# Patient Record
Sex: MALE | Race: WHITE | Employment: OTHER | URBAN - NONMETROPOLITAN AREA
[De-identification: names, ages, dates, MRNs, and addresses within clinical notes are randomized per-mention and may not be internally consistent; named-entity substitution may affect disease eponyms.]

---

## 2017-01-10 ENCOUNTER — ALLIED HEALTH/NURSE VISIT (OUTPATIENT)
Dept: FAMILY MEDICINE | Facility: CLINIC | Age: 67
End: 2017-01-10
Payer: COMMERCIAL

## 2017-01-10 DIAGNOSIS — C43.9 MELANOMA OF SKIN (H): Primary | ICD-10-CM

## 2017-01-10 PROCEDURE — 99207 ZZC NO CHARGE NURSE ONLY: CPT

## 2017-01-10 NOTE — PROGRESS NOTES
Placed band- aids on the incision area with Aqua phor per patient request.  Wound looked good.  There was a little redness around the site.  No drainage or warmth to the area.  Patient will follow up with Derm.    Angelika LAINEZ RN

## 2017-02-14 ENCOUNTER — OFFICE VISIT (OUTPATIENT)
Dept: FAMILY MEDICINE | Facility: CLINIC | Age: 67
End: 2017-02-14
Payer: COMMERCIAL

## 2017-02-14 VITALS
HEIGHT: 77 IN | SYSTOLIC BLOOD PRESSURE: 150 MMHG | DIASTOLIC BLOOD PRESSURE: 82 MMHG | HEART RATE: 60 BPM | BODY MASS INDEX: 37.19 KG/M2 | WEIGHT: 315 LBS

## 2017-02-14 DIAGNOSIS — I10 HYPERTENSION GOAL BP (BLOOD PRESSURE) < 140/90: ICD-10-CM

## 2017-02-14 DIAGNOSIS — K80.10 CALCULUS OF GALLBLADDER WITH CHOLECYSTITIS WITHOUT BILIARY OBSTRUCTION, UNSPECIFIED CHOLECYSTITIS ACUITY: ICD-10-CM

## 2017-02-14 DIAGNOSIS — R10.13 EPIGASTRIC PAIN: Primary | ICD-10-CM

## 2017-02-14 PROCEDURE — 99214 OFFICE O/P EST MOD 30 MIN: CPT | Performed by: FAMILY MEDICINE

## 2017-02-14 NOTE — PATIENT INSTRUCTIONS
Take Prilosec OTC daily until we get the results of the ultrasound.  Avoid fatty foods in the meantime. Call [phone] to schedule the ultrasound.    It is very important that you take your metoprolol every day

## 2017-02-14 NOTE — MR AVS SNAPSHOT
"              After Visit Summary   2/14/2017    Angel Hill    MRN: [id number]           Patient Information     Date Of Birth          1950        Visit Information        Provider Department      2/14/2017 8:00 AM SHEKHAR Bueno MD [facility]        Today's Diagnoses     Epigastric pain    -  1      Care Instructions    Take Prilosec OTC daily until we get the results of the ultrasound.  Avoid fatty foods in the meantime. Call [phone] to schedule the ultrasound.    It is very important that you take your metoprolol every day        Follow-ups after your visit        Future tests that were ordered for you today     Open Future Orders        Priority Expected Expires Ordered    US Abdomen Complete Routine 5/15/2017 2/14/2018 2/14/2017            Who to contact     If you have questions or need follow up information about today's clinic visit or your schedule please contact [facility] directly at [phone].  Normal or non-critical lab and imaging results will be communicated to you by [employer], letter or phone within 4 business days after the clinic has received the results. If you do not hear from us within 7 days, please contact the clinic through [employer] or phone. If you have a critical or abnormal lab result, we will notify you by phone as soon as possible.  Submit refill requests through [employer] or call your pharmacy and they will forward the refill request to us. Please allow 3 business days for your refill to be completed.          Additional Information About Your Visit        [employer] Information     [employer] lets you send messages to your doctor, view your test results, renew your prescriptions, schedule appointments and more. To sign up, go to [email or web address] . Click on \"Log in\" on the left side of the screen, which will take you to the Welcome page. Then click on \"Sign up Now\" on the right side of the page.     You will be asked to enter the access " "code listed below, as well as some personal information. Please follow the directions to create your username and password.     Your access code is: [email or web address]  Expires: 5/15/2017  8:37 AM     Your access code will  in 90 days. If you need help or a new code, please call your [facility] clinic or [phone].        Care EveryWhere ID     This is your Care EveryWhere ID. This could be used by other organizations to access your [facility] medical records  [id number]        Your Vitals Were     Pulse Height BMI (Body Mass Index)             60 6' 5\" (1.956 m) 39.84 kg/m2          Blood Pressure from Last 3 Encounters:   17 150/82   10/01/16 (!) 182/98   16 139/79    Weight from Last 3 Encounters:   17 (!) 336 lb (152.4 kg)   16 (!) 336 lb (152.4 kg)   16 (!) 332 lb (150.6 kg)               Primary Care Provider Office Phone # Fax #    Ric Umanzor [phone]       [facility] [id number] [place] [id number]        Thank you!     Thank you for choosing [facility]  for your care. Our goal is always to provide you with excellent care. Hearing back from our patients is one way we can continue to improve our services. Please take a few minutes to complete the written survey that you may receive in the mail after your visit with us. Thank you!             Your Updated Medication List - Protect others around you: Learn how to safely use, store and throw away your medicines at www.disposemymeds.org.          This list is accurate as of: 17  8:37 AM.  Always use your most recent med list.                   Brand Name Dispense Instructions for use    ASPIRIN PO      Take 650 mg by mouth       coenzyme Q-10 200 MG Caps          fish oil-omega-3 fatty acids 1000 MG capsule      Take 2 g by mouth daily       metoprolol 200 MG 24 hr tablet    TOPROL-XL    90 tablet    Take 1 tablet (200 mg) by mouth daily       VITAMIN D3 PO      " Take 1,000 Units by mouth daily

## 2017-02-14 NOTE — PROGRESS NOTES
"Subjective:  Angel Hill is a 66 year old male   Chief Complaint   Patient presents with     Patient Request     concerns with a lot of gas, bloating worse after eating X 1 month. last BM yesterday pt. states that seems to be fine.      Medication Reconciliation     pt. states he is not consistent with taking Metoprolol.      He states that he will get this discomfort with bloating and feeling like he has gas to matter what he eats. It was wake him up in the middle the night. He has not made any significant change in his diet. He has tried several antacids and these do not seem to help at all. No known history of gallbladder disease. There is been no change in his bowel movements.    He admits he has been very inconsistent in taking his metoprolol        Encounter Diagnoses   Name Primary?     Epigastric pain Yes     Hypertension goal BP (blood pressure) < 140/90        ROS:General: No change in weight, sleep or appetite.  Normal energy.  No fever or chills  Resp: No coughing, wheezing or shortness of breath  CV: No chest pains or palpitations  GI: As above, otherwise negative  : No urinary frequency or dysuria, bladder or kidney problems    Medical, surgical, social, and family histories, medications and allergies reviewed and updated.    Objective:  Exam:  /82 (BP Location: Right arm, Patient Position: Chair, Cuff Size: Adult Large)  Pulse 60  Ht 6' 5\" (1.956 m)  Wt (!) 336 lb (152.4 kg)  BMI 39.84 kg/m2 GENERAL APPEARANCE ADULT: Alert, no acute distress  EYES: PERRL, EOM normal, conjunctiva and lids normal  NECK: No adenopathy,masses or thyromegaly  RESP: lungs clear to auscultation   CV: normal rate, regular rhythm, no murmur or gallop  ABDOMEN: soft, no organomegaly or masses , bowel sounds normal, tender epigastric moderate, moderate distension  MS: extremities normal, no peripheral edema      ASSESSMENT:  1. Epigastric pain    2. Hypertension goal BP (blood pressure) < 140/90     differential " diagnosis for the epigastric pain would include gallbladder disease, gastritis, esophageal reflux.    Hypertension inadequately controlled due to noncompliance    PLAN:  Orders Placed This Encounter     US Abdomen Complete       Patient Instructions   Take Prilosec OTC daily until we get the results of the ultrasound.  Avoid fatty foods in the meantime. Call [phone] to schedule the ultrasound.    It is very important that you take your metoprolol every day

## 2017-02-16 ENCOUNTER — HOSPITAL ENCOUNTER (OUTPATIENT)
Dept: ULTRASOUND IMAGING | Facility: CLINIC | Age: 67
Discharge: HOME | End: 2017-02-16
Attending: FAMILY MEDICINE | Admitting: FAMILY MEDICINE
Payer: COMMERCIAL

## 2017-02-16 DIAGNOSIS — R10.13 EPIGASTRIC PAIN: ICD-10-CM

## 2017-02-16 PROCEDURE — 76700 US EXAM ABDOM COMPLETE: CPT

## 2017-02-16 NOTE — PROGRESS NOTES
Please CALL PATIENT    Notify patient of ABNORMAL results.  The gallbladder has stones in it and could be the cause of this discomfort. I put in a referral for you to see one of the surgeons over at [place]. Call [phone] to schedule

## 2017-02-17 ENCOUNTER — OFFICE VISIT (OUTPATIENT)
Dept: SURGERY | Facility: CLINIC | Age: 67
End: 2017-02-17
Payer: COMMERCIAL

## 2017-02-17 VITALS
WEIGHT: 315 LBS | BODY MASS INDEX: 37.19 KG/M2 | SYSTOLIC BLOOD PRESSURE: 139 MMHG | HEIGHT: 77 IN | DIASTOLIC BLOOD PRESSURE: 72 MMHG | TEMPERATURE: 98 F | HEART RATE: 65 BPM

## 2017-02-17 DIAGNOSIS — R14.0 BLOATING: Primary | ICD-10-CM

## 2017-02-17 PROCEDURE — 99204 OFFICE O/P NEW MOD 45 MIN: CPT | Performed by: SURGERY

## 2017-02-17 NOTE — PROGRESS NOTES
"Asked to see patient in consultation for his abdominal pain.    66-year-old male complaining of generalized gastric pain and bloating. Patient has been having symptoms for well over a year. He reports he has difficulty burping or passing gas. He feels like he is \"bound up\" with gas. Patient denies pain per se but does report a generalized discomfort. He denies this is associated with food and he denies yonny pain. Patient is a long-haul . Patient denies fevers chills nausea and vomiting. He does report decreased sleep secondary to the gas.    Patient Active Problem List   Diagnosis     Melanoma of skin (H)     Urethritis     PROSTATE CARCINOMA     Obesity     CARDIOVASCULAR SCREENING; LDL GOAL LESS THAN 130     Advanced directives, counseling/discussion     Hypertension goal BP (blood pressure) < 140/90     Osteoarthritis of left hip, unspecified osteoarthritis type       Past Medical History   Diagnosis Date     Melanoma of skin, site unspecified      melanoma     Urethritis, unspecified      ulcer 20 + years ago       Past Surgical History   Procedure Laterality Date     Surgical history of -        cyst removed from face     Surgical history of -        imapacted wisdom teeth     Surgical history of -   1997     hammer toe     Surgical history of -   7/01     metastalic melanoma     Colonoscopy  9/19/2002     Surgical history of -   9/01     left axillary node removal     Surgical history of -   4/08     prostate cryoablation     Ablate vein varicose radio frequency without phlebectomy multiple stab  9/20/2012     Procedure: ABLATE VEIN VARICOSE RADIO FREQUENCY WITHOUT PHLEBECTOMY MULTIPLE STAB;  Right Leg VNUS Ablation, Right ankle ulcer debridement;  Surgeon: Jordan Lopez MD;  Location: [facility] OR       Family History   Problem Relation Age of Onset     Musculoskeletal Disorder Mother      CANCER Father      Hypertension Maternal Grandmother      HEART DISEASE Maternal Grandfather      HEART " DISEASE Paternal Grandmother      HEART DISEASE Paternal Grandfather      Musculoskeletal Disorder Sister      foot problems     Unknown/Adopted Sister      Unknown/Adopted Brother        Social History   Substance Use Topics     Smoking status: Former Smoker     Quit date: 1/1/1988     Smokeless tobacco: Former User      Comment: 1980     Alcohol use Yes      Comment: 2 beers once a week        Smoking - Counseled patient on the effects of smoking on surgical issues such as wound healing and infection rates.    History   Drug Use No       Current Outpatient Prescriptions   Medication Sig Dispense Refill     Cholecalciferol (VITAMIN D3 PO) Take 1,000 Units by mouth daily       ASPIRIN PO Take 650 mg by mouth       metoprolol (TOPROL-XL) 200 MG 24 hr tablet Take 1 tablet (200 mg) by mouth daily 90 tablet 3     coenzyme Q-10 200 MG CAPS        fish oil-omega-3 fatty acids (OMEGA 3) 1000 MG capsule Take 2 g by mouth daily         Allergies   Allergen Reactions     Nka [No Known Allergies]      Results for orders placed or performed during the hospital encounter of 02/16/17   US Abdomen Complete    Narrative    US ABDOMEN COMPLETE 2/16/2017 8:24 AM    HISTORY: Epigastric pain.    TECHNIQUE: Routine assessed structures include the gallbladder, bile  ducts, liver, pancreas, kidneys, and spleen size. Also assessed are  the abdominal aorta and hepatic portion of the IVC.    COMPARISON: None.    FINDINGS: Increased echogenicity to the liver parenchyma is seen in a  diffuse fashion. No focal liver lesion is demonstrated. A single  gallstone is present in the gallbladder. There is no gallbladder wall  thickening or biliary dilatation. The pancreas is partially obscured  by overlying bowel gas. The kidneys are within normal limits. The  spleen is not enlarged. The abdominal aorta is not aneurysmal.        Impression    IMPRESSION:    1. Fatty infiltration of liver.  2. Cholelithiasis.        JOSH FRITZ MD  "    ROS  Constitutional - Denies fevers, weight loss, malaise, lethargy  Neuro - Denies tremors or seizures  Pulmon - Denies SOB, dyspnea, hemoptysis, chronic cough or use of an inhaler  CV - Denies CP, SOB, lower extremity edema, difficulty w/ stairs, has never used NTG  GI - Denies hematemesis, BRBPR, melena, chronic diarrhea or epigastric pain   - Denies hematuria, difficulty voiding, h/o STDs  Hematology - Denies blood clotting disorders, chronic anemias  Dermatology - No melanomas or skin cancers  Rheumatology - No h/o RA  Pysch - Denies depression, bipolar d/o or schizophrenia    Exam:  Patient Vitals for the past 24 hrs:   BP Temp Temp src Pulse Height Weight   02/17/17 0738 139/72 98  F (36.7  C) Oral 65 1.956 m (6' 5.01\") (!) 152.4 kg (336 lb)       General - Alert and Oriented X4, NAD, well nourished  HEENT - Normocephalic, atraumatic, PERRLA, Nose midline, Throat without lesions  Neck - supple, no LAD, Thyroid normal, Carotids without bruits  Lungs - Clear to auscultation bilaterally with good inspiratory effort, no tactile fremitus  CV - Heart RRR, no lift's, thrills, murmurs, rubs, or gallops. Carotid, radial, and femoral pulses 2+ bilaterally  Abdomen - Soft, non-tender, +BS, no hepatosplenomegaly, no palpable masses  Neuro - Full ROM, Strength 5/5 and major muscle groups, sensation intact  Extremities - No cyanosis, clubbing or edema    Assessment and plan: 66-year-old male with generalized bloating and gas pain. Unlikely this is related to his single gallstone which is mobile and does not appear to be lodged in the neck of the gallbladder. Recommend upper endoscopy to evaluate patient's stomach and he will be needing a colonoscopy as his last one was in 2002. I will go ahead and schedule both of these.    Georges Kiser MD   "

## 2017-02-17 NOTE — MR AVS SNAPSHOT
After Visit Summary   2/17/2017    Angel Hill    MRN: [id number]           Patient Information     Date Of Birth          1950        Visit Information        Provider Department      2/17/2017 8:00 AM Georges Kiser MD [facility]        Today's Diagnoses     Bloating    -  1      Care Instructions    Per Dr. Kiser's instructions        Follow-ups after your visit        Additional Services     GASTROENTEROLOGY ADULT REF PROCEDURE ONLY       Last Lab Result: Creatinine (mg/dL)       Date                     Value                 05/08/2015               0.82             ----------  Body mass index is 39.84 kg/(m^2).      Patient will be contacted to schedule procedure.     Please be aware that coverage of these services is subject to the terms and limitations of your health insurance plan.  Call member services at your health plan with any benefit or coverage questions.  Any procedures must be performed at a [facility] facility OR coordinated by your clinic's referral office.    Please bring the following with you to your appointment:    (1) Any X-Rays, CTs or MRIs which have been performed.  Contact the facility where they were done to arrange for  prior to your scheduled appointment.    (2) List of current medications   (3) This referral request   (4) Any documents/labs given to you for this referral                  Who to contact     If you have questions or need follow up information about today's clinic visit or your schedule please contact [facility] directly at [phone].  Normal or non-critical lab and imaging results will be communicated to you by MyChart, letter or phone within 4 business days after the clinic has received the results. If you do not hear from us within 7 days, please contact the clinic through MyChart or phone. If you have a critical or abnormal lab result, we will notify you by phone as soon as possible.  Submit refill requests  "through [employer] or call your pharmacy and they will forward the refill request to us. Please allow 3 business days for your refill to be completed.          Additional Information About Your Visit        [employer] Information     [employer] lets you send messages to your doctor, view your test results, renew your prescriptions, schedule appointments and more. To sign up, go to [email or web address] . Click on \"Log in\" on the left side of the screen, which will take you to the Welcome page. Then click on \"Sign up Now\" on the right side of the page.     You will be asked to enter the access code listed below, as well as some personal information. Please follow the directions to create your username and password.     Your access code is: [email or web address]  Expires: 5/15/2017  8:37 AM     Your access code will  in 90 days. If you need help or a new code, please call your [facility] clinic or [phone].        Care EveryWhere ID     This is your Care EveryWhere ID. This could be used by other organizations to access your [facility] medical records  [id number]        Your Vitals Were     Pulse Temperature Height BMI (Body Mass Index)          65 98  F (36.7  C) (Oral) 1.956 m (6' 5.01\") 39.84 kg/m2         Blood Pressure from Last 3 Encounters:   17 139/72   17 150/82   10/01/16 (!) 182/98    Weight from Last 3 Encounters:   17 (!) 152.4 kg (336 lb)   17 (!) 152.4 kg (336 lb)   16 (!) 152.4 kg (336 lb)              We Performed the Following     GASTROENTEROLOGY ADULT REF PROCEDURE ONLY        Primary Care Provider Office Phone # Fax #    Ric Umanzor [phone]       [facility] [address] [facility] [address]        Thank you!     Thank you for choosing [facility]  for your care. Our goal is always to provide you with excellent care. Hearing back from our patients is one way we can continue to improve our services. Please take a few " minutes to complete the written survey that you may receive in the mail after your visit with us. Thank you!             Your Updated Medication List - Protect others around you: Learn how to safely use, store and throw away your medicines at www.disposemymeds.org.          This list is accurate as of: 2/17/17  8:02 AM.  Always use your most recent med list.                   Brand Name Dispense Instructions for use    ASPIRIN PO      Take 650 mg by mouth       coenzyme Q-10 200 MG Caps          fish oil-omega-3 fatty acids 1000 MG capsule      Take 2 g by mouth daily       metoprolol 200 MG 24 hr tablet    TOPROL-XL    90 tablet    Take 1 tablet (200 mg) by mouth daily       VITAMIN D3 PO      Take 1,000 Units by mouth daily

## 2017-02-17 NOTE — NURSING NOTE
"Initial /72 (BP Location: Right arm, Patient Position: Chair, Cuff Size: Adult Large)  Pulse 65  Temp 98  F (36.7  C) (Oral)  Ht 1.956 m (6' 5.01\")  Wt (!) 152.4 kg (336 lb)  BMI 39.84 kg/m2 Estimated body mass index is 39.84 kg/(m^2) as calculated from the following:    Height as of this encounter: 1.956 m (6' 5.01\").    Weight as of this encounter: 152.4 kg (336 lb). .    Jeannette Orlando MA    "

## 2017-02-22 ENCOUNTER — ANESTHESIA EVENT (OUTPATIENT)
Dept: GASTROENTEROLOGY | Facility: CLINIC | Age: 67
End: 2017-02-22
Payer: COMMERCIAL

## 2017-02-22 ASSESSMENT — LIFESTYLE VARIABLES: TOBACCO_USE: 1

## 2017-02-22 NOTE — ANESTHESIA PREPROCEDURE EVALUATION
Anesthesia Evaluation     . Pt has had prior anesthetic.       ROS/MED HX    ENT/Pulmonary:     (+)tobacco use, Past use , . .    Neurologic:       Cardiovascular:     (+) hypertension----. : . . . :. .       METS/Exercise Tolerance:     Hematologic:         Musculoskeletal:   (+) arthritis, , , -       GI/Hepatic:         Renal/Genitourinary:     (+) Other Renal/ Genitourinary,       Endo:     (+) Obesity, .   Type II DM: urethritis.   Psychiatric:         Infectious Disease:         Malignancy:   (+) Malignancy History of Skin and Prostate          Other:               Physical Exam  Normal systems: cardiovascular, pulmonary and dental    Airway   Mallampati: I  TM distance: >3 FB  Neck ROM: full    Dental     Cardiovascular   Rhythm and rate: regular and normal      Pulmonary    breath sounds clear to auscultation                    Anesthesia Plan      History & Physical Review  History and physical reviewed and following examination; no interval change.    ASA Status:  3 .    NPO Status:  > 6 hours    Plan for MAC Reason for MAC:  Deep or markedly invasive procedure (G8)         Postoperative Care      Consents  Anesthetic plan, risks, benefits and alternatives discussed with:  Patient..                          .

## 2017-02-23 ENCOUNTER — ANESTHESIA (OUTPATIENT)
Dept: GASTROENTEROLOGY | Facility: CLINIC | Age: 67
End: 2017-02-23
Payer: COMMERCIAL

## 2017-02-23 ENCOUNTER — HOSPITAL ENCOUNTER (OUTPATIENT)
Facility: CLINIC | Age: 67
Discharge: HOME | End: 2017-02-23
Attending: SURGERY | Admitting: SURGERY
Payer: COMMERCIAL

## 2017-02-23 ENCOUNTER — SURGERY (OUTPATIENT)
Age: 67
End: 2017-02-23

## 2017-02-23 VITALS
SYSTOLIC BLOOD PRESSURE: 115 MMHG | TEMPERATURE: 98.1 F | RESPIRATION RATE: 16 BRPM | OXYGEN SATURATION: 96 % | DIASTOLIC BLOOD PRESSURE: 46 MMHG | HEART RATE: 61 BPM

## 2017-02-23 LAB
COLONOSCOPY: NORMAL
UPPER GI ENDOSCOPY: NORMAL

## 2017-02-23 PROCEDURE — 25000125 ZZHC RX 250: Performed by: NURSE ANESTHETIST, CERTIFIED REGISTERED

## 2017-02-23 PROCEDURE — 88305 TISSUE EXAM BY PATHOLOGIST: CPT | Mod: 26 | Performed by: SURGERY

## 2017-02-23 PROCEDURE — 25000132 ZZH RX MED GY IP 250 OP 250 PS 637: Performed by: SURGERY

## 2017-02-23 PROCEDURE — G0121 COLON CA SCRN NOT HI RSK IND: HCPCS | Performed by: SURGERY

## 2017-02-23 PROCEDURE — 43239 EGD BIOPSY SINGLE/MULTIPLE: CPT | Performed by: SURGERY

## 2017-02-23 PROCEDURE — 88305 TISSUE EXAM BY PATHOLOGIST: CPT | Performed by: SURGERY

## 2017-02-23 PROCEDURE — 25000125 ZZHC RX 250: Performed by: SURGERY

## 2017-02-23 PROCEDURE — 45378 DIAGNOSTIC COLONOSCOPY: CPT | Performed by: SURGERY

## 2017-02-23 PROCEDURE — 37000008 ZZH ANESTHESIA TECHNICAL FEE, 1ST 30 MIN: Performed by: SURGERY

## 2017-02-23 RX ORDER — SIMETHICONE 40MG/0.6ML
SUSPENSION, DROPS(FINAL DOSAGE FORM)(ML) ORAL PRN
Status: DISCONTINUED | OUTPATIENT
Start: 2017-02-23 | End: 2017-02-23 | Stop reason: HOSPADM

## 2017-02-23 RX ORDER — PROPOFOL 10 MG/ML
INJECTION, EMULSION INTRAVENOUS PRN
Status: DISCONTINUED | OUTPATIENT
Start: 2017-02-23 | End: 2017-02-23

## 2017-02-23 RX ORDER — LIDOCAINE 40 MG/G
CREAM TOPICAL
Status: DISCONTINUED | OUTPATIENT
Start: 2017-02-23 | End: 2017-02-23 | Stop reason: HOSPADM

## 2017-02-23 RX ORDER — ONDANSETRON 2 MG/ML
4 INJECTION INTRAMUSCULAR; INTRAVENOUS
Status: DISCONTINUED | OUTPATIENT
Start: 2017-02-23 | End: 2017-02-23 | Stop reason: HOSPADM

## 2017-02-23 RX ADMIN — PROPOFOL 100 MG: 10 INJECTION, EMULSION INTRAVENOUS at 12:56

## 2017-02-23 RX ADMIN — PROPOFOL 100 MG: 10 INJECTION, EMULSION INTRAVENOUS at 12:55

## 2017-02-23 RX ADMIN — PROPOFOL 100 MG: 10 INJECTION, EMULSION INTRAVENOUS at 12:51

## 2017-02-23 RX ADMIN — PROPOFOL 100 MG: 10 INJECTION, EMULSION INTRAVENOUS at 12:53

## 2017-02-23 RX ADMIN — SIMETHICONE 40 MG: 63.3; 3.7 SOLUTION/ DROPS ORAL at 12:51

## 2017-02-23 RX ADMIN — BENZOCAINE 1 SPRAY: 220 SPRAY, METERED PERIODONTAL at 12:51

## 2017-02-23 RX ADMIN — LIDOCAINE HYDROCHLORIDE 1 ML: 10 INJECTION, SOLUTION INFILTRATION; PERINEURAL at 12:02

## 2017-02-23 RX ADMIN — PROPOFOL 100 MG: 10 INJECTION, EMULSION INTRAVENOUS at 12:52

## 2017-02-23 RX ADMIN — PROPOFOL 100 MG: 10 INJECTION, EMULSION INTRAVENOUS at 12:54

## 2017-02-23 NOTE — ANESTHESIA POSTPROCEDURE EVALUATION
Patient: Angel Hill    Procedure(s):  Colonoscopy & Gastroscopy   - Wound Class: II-Clean Contaminated   - Wound Class: II-Clean Contaminated    Diagnosis:;  Diagnosis Additional Information: No value filed.    Anesthesia Type:  MAC    Note:  Anesthesia Post Evaluation    Patient location during evaluation: Bedside  Patient participation: Able to fully participate in evaluation  Level of consciousness: awake and alert  Pain management: adequate  Airway patency: patent  Cardiovascular status: acceptable  Respiratory status: acceptable  Hydration status: acceptable  PONV: none     Anesthetic complications: None          Last vitals:  Vitals:    02/23/17 1140   BP: 165/81   Pulse: 74   Resp: 16   Temp: 36.7  C (98.1  F)         Electronically Signed By: DAGOBERTO Christensen CRNA  February 23, 2017  1:10 PM

## 2017-02-23 NOTE — OP NOTE
EGD and Colonoscopy -normal esophagus, stomach and duodenum.  Colon without masses, polyps, or diverticula.

## 2017-02-23 NOTE — ANESTHESIA CARE TRANSFER NOTE
Patient: Angel Hill    Procedure(s):  Colonoscopy & Gastroscopy   - Wound Class: II-Clean Contaminated   - Wound Class: II-Clean Contaminated    Diagnosis: ;  Diagnosis Additional Information: No value filed.    Anesthesia Type:   MAC     Note:  Airway :Room Air  Patient transferred to:Phase II        Vitals: (Last set prior to Anesthesia Care Transfer)    CRNA VITALS  2/23/2017 1239 - 2/23/2017 1309      2/23/2017             Pulse: 63    SpO2: 96 %                Electronically Signed By: DAGOBERTO Christensen CRNA  February 23, 2017  1:09 PM

## 2017-02-23 NOTE — H&P
66 year old year old male here for upper and lower endoscopy for history of bloating.        Patient Active Problem List   Diagnosis     Melanoma of skin (H)     Urethritis     PROSTATE CARCINOMA     Obesity     CARDIOVASCULAR SCREENING; LDL GOAL LESS THAN 130     Advanced directives, counseling/discussion     Hypertension goal BP (blood pressure) < 140/90     Osteoarthritis of left hip, unspecified osteoarthritis type       Past Medical History   Diagnosis Date     Melanoma of skin, site unspecified      melanoma     Urethritis, unspecified      ulcer 20 + years ago       Past Surgical History   Procedure Laterality Date     Surgical history of -        cyst removed from face     Surgical history of -        imapacted wisdom teeth     Surgical history of -   1997     hammer toe     Surgical history of -   7/01     metastalic melanoma     Colonoscopy  9/19/2002     Surgical history of -   9/01     left axillary node removal     Surgical history of -   4/08     prostate cryoablation     Ablate vein varicose radio frequency without phlebectomy multiple stab  9/20/2012     Procedure: ABLATE VEIN VARICOSE RADIO FREQUENCY WITHOUT PHLEBECTOMY MULTIPLE STAB;  Right Leg VNUS Ablation, Right ankle ulcer debridement;  Surgeon: Jordan Lopez MD;  Location: [facility] OR       Family History   Problem Relation Age of Onset     Musculoskeletal Disorder Mother      CANCER Father      Hypertension Maternal Grandmother      HEART DISEASE Maternal Grandfather      HEART DISEASE Paternal Grandmother      HEART DISEASE Paternal Grandfather      Musculoskeletal Disorder Sister      foot problems     Unknown/Adopted Sister      Unknown/Adopted Brother        No current outpatient prescriptions on file.       Allergies   Allergen Reactions     Nka [No Known Allergies]        Pt reports that he quit smoking about 29 years ago. He has quit using smokeless tobacco. He reports that he drinks alcohol. He reports that he does not use illicit  drugs.    Exam:    Awake, Alert OX3  Lungs - CTA bilaterally  CV - RRR, no murmurs, distal pulses intact  Abd - soft, non-distended, non-tender, +BS  Extr - No cyanosis or edema    A/P 66 year old year old male in need ofupper and lower endoscopy for history of bloating  . Risks, benefits, alternatives, and complications were discussed including the possibility of perforation and the patient agreed to proceed.    Georges Kiser MD

## 2017-02-27 LAB — COPATH REPORT: NORMAL

## 2017-03-01 ENCOUNTER — TELEPHONE (OUTPATIENT)
Dept: SURGERY | Facility: CLINIC | Age: 67
End: 2017-03-01

## 2017-03-01 NOTE — TELEPHONE ENCOUNTER
Patient advised of normal result from the result note basket per Dr. Kiser. Advised if he has further troubles to see Dr. Bueno again.    Maria A Bartlett RN

## 2017-03-01 NOTE — TELEPHONE ENCOUNTER
Reason for Call:  Request for results:    Name of test or procedure: gastro and colonoscopy bx results    Date of test of procedure: 2-23-17    Location of the test or procedure:  [place]    OK to leave the result message on voice mail or with a family member? Not Applicable    Phone number Patient can be reached at:  Home number on file [phone] (home)    Additional comments: NA    Call taken on 3/1/2017 at 8:20 AM by Mili Gonsales

## 2017-05-02 ENCOUNTER — TELEPHONE (OUTPATIENT)
Dept: FAMILY MEDICINE | Facility: CLINIC | Age: 67
End: 2017-05-02

## 2017-05-02 DIAGNOSIS — M16.12 OSTEOARTHRITIS OF LEFT HIP, UNSPECIFIED OSTEOARTHRITIS TYPE: Primary | ICD-10-CM

## 2017-05-02 NOTE — TELEPHONE ENCOUNTER
Order was placed. Notify patient that someone from ortho [facility] should be contacting him within 24 hours

## 2017-05-02 NOTE — TELEPHONE ENCOUNTER
Reason for Call: Request for an order or referral:    Order or referral being requested: Pt continues to have hip pain and he wants to know if he can get a referral to see an ortho specialist for a hip replacement.      Date needed: at your convenience    Has the patient been seen by the PCP for this problem? YES    Additional comments:     Phone number Patient can be reached at:  Home number on file [phone] (home)    Best Time:  any    Can we leave a detailed message on this number?  YES    Call taken on 5/2/2017 at 8:49 AM by Sonia Arias

## 2017-05-02 NOTE — TELEPHONE ENCOUNTER
Patient was already contacted by [facility] and has an appt with [facility]. Svitlana GUAJARDO RN

## 2017-05-02 NOTE — TELEPHONE ENCOUNTER
Writer returned call to pt  Pt was very upset stating over and over that he is in pain and needs to see a specialist.  Last office visit with provider was 2/14/17  He is asking to see a specialists due to financial reasons.   Referral is pending   Please advise   Thank you   Felicitas BACH RN

## 2017-05-02 NOTE — TELEPHONE ENCOUNTER
Tried to transfer to RN but she was unavailable  Please return call to [phone] when able   Thank you     Saima Reilly  [facility]

## 2017-06-05 ENCOUNTER — HOSPITAL ENCOUNTER (OUTPATIENT)
Dept: PHYSICAL THERAPY | Facility: CLINIC | Age: 67
Setting detail: THERAPIES SERIES
End: 2017-06-05
Attending: PHYSICAL MEDICINE & REHABILITATION
Payer: COMMERCIAL

## 2017-06-05 PROCEDURE — 97110 THERAPEUTIC EXERCISES: CPT | Mod: GP | Performed by: PHYSICAL THERAPIST

## 2017-06-05 PROCEDURE — 97161 PT EVAL LOW COMPLEX 20 MIN: CPT | Mod: GP | Performed by: PHYSICAL THERAPIST

## 2017-06-05 PROCEDURE — 40000718 ZZHC STATISTIC PT DEPARTMENT ORTHO VISIT: Performed by: PHYSICAL THERAPIST

## 2017-06-05 PROCEDURE — 97140 MANUAL THERAPY 1/> REGIONS: CPT | Mod: GP | Performed by: PHYSICAL THERAPIST

## 2017-06-05 NOTE — PROGRESS NOTES
06/05/17 0700   General Information   Type of Visit Initial OP Ortho PT Evaluation   Start of Care Date 06/05/17   Referring Physician Delmar Blackmon    Patient/Family Goals Statement Decrease P, P w/ sit-stand, Prolonged standing, Falling asleep easier.    Orders Evaluate and Treat   Orders Comment Modalities prn.    Date of Order 05/18/17   Insurance Type Other   Insurance Comments/Visits Authorized [employer] - aut'd to 12/31/17    Medical Diagnosis L SI Jt dysfunction and Mm spasms   Surgical/Medical history reviewed (High BP, OA, Hx of Melanoma, Prostate CA. )   General Information Comments No new meds, did not want to take STeroids or Mm relaxant.    Body Part(s)   Body Part(s) Lumbar Spine/SI   Presentation and Etiology   Pertinent history of current problem (include personal factors and/or comorbidities that impact the POC) 1 1/2 year ago onset, Flared in Feb of this year. Was not working at that time so was less active. Started to see chiropractor April this year and it helped, Saw Ortho MD, who sent him to a spine speciailist. Did have X'Rays of L hip which were normal. Hx of short R leg. Does not wear heal lift, but had one when very young.    Impairments A. Pain;B. Decreased WB tolerance;F. Decreased strength and endurance;G. Impaired balance;H. Impaired gait;K. Numbness;P. Bowel or bladder problems   Functional Limitations perform required work activities;perform desired leisure / sports activities   Symptom Location P Very L low back, into L hip and down side of Leg to just past knee. Numbness L Lower lateral leg. Now mostly inside of L Lower leg.    How/Where did it occur From insidious onset   Onset date of current episode/exacerbation 05/18/17   Chronicity Chronic   Pain rating (0-10 point scale) (0-7/10 )   Pain quality C. Aching;E. Shooting;F. Stabbing   Frequency of pain/symptoms B. Intermittent  (Sleeping )   Pain/symptoms are: The same all the time   Pain/symptoms exacerbated by A. Sitting    Pain/symptoms eased by B. Walking;F. Certain positions;I. OTC medication(s)   Progression of symptoms since onset: Worsened   Current Level of Function   Current Community Support Family/friend caregiver   Patient role/employment history A. Employed  ()   Living environment [place]   Fall Risk Screen   Fall screen completed by PT   Per patient - Fall 2 or more times in past year? No   Per patient - Fall with injury in past year? No   Timed Up and Go score (seconds) 16   Is patient a fall risk? Yes   Functional Scales   Functional Scales Patient Specific Functional Scale   Patient Specific Functional Scale Q1:;Q2:;Q3:   Q1: P w/ Sit to stand    Q2: Prolonged standing a lot of difficulty    Q3: Difficulty falling asleep at night.    Lumbar Spine/SI Objective Findings   Observation P Behaviors    Posture Head forward, Decreased Lumbar lordosis,    Gait/Locomotion Antalgic on L. Sway to L.    Flexion ROM 50% w/ tight HS's and increased P,    Extension ROM 25%    Right Side Bending ROM 50%   Left Side Bending ROM 50%   Repeated Extension-Standing ROM Increased P into L HS area.    Repeated Flexion-Standing ROM Increases Pulling outside of L knee. Increases P in LB and L buttock.    Lumbar ROM Comment Significantly Short R Leg.    Pelvic Screen Unable to lay completely flat, so unable to fully assess pelvis.    Hip Screen Scour negative    Hip Flexion (L2) Strength Normal    Hip Abduction Strength Normal    Hip Adduction Strength Normal    Knee Flexion Strength Normal    Knee Extension (L3) Strength Normal    Ankle Dorsiflexion (L4) Strength Normal    Hamstring Flexibility R -55, L -45   Hip Flexor Flexibility Tight B    Quadricep Flexibility Tigth L>R    Piriformis Flexibility Significanlty Tight B    SLR L Questionable. Significantly tight HS's also.    Aguilar Test Tight B    Segmental Mobility ERS R L3, Pelvis normal w/ movements.    Patellar Tendon Reflexes  Normal per patient    Achilles Tendon  Reflexes Normal per patient    Palpation Very tender R Paraspinals L4 area.    Planned Therapy Interventions   Planned Therapy Interventions Comment STM, Jt mobs, MET, Develop HEP for Flexibility and Strengthening. Would benefit from heel lift d/t leg length discrepancy.    Planned Modality Interventions   Planned Modality Interventions Comments E.Stim only if needed.    Clinical Impression   Criteria for Skilled Therapeutic Interventions Met yes, treatment indicated   PT Diagnosis Mech LBP, Mm imbalance, Tight hip mm's.    Influenced by the following impairments Pain, Decreased WBing, Strength, Trouble w/ balance, gait.    Functional limitations due to impairments Workability, HH duties.    Clinical Presentation Evolving/Changing   Clinical Presentation Rationale Flexibility, LB Alignment, Unable to assess pelvis totally. LB ROM.    Clinical Decision Making (Complexity) Low complexity   Therapy Frequency 2 times/Week   Predicted Duration of Therapy Intervention (days/wks) 1 month, then reassess, 9 visits.    Risk & Benefits of therapy have been explained Yes   Patient, Family & other staff in agreement with plan of care Yes   Clinical Impression Comments Mech LBP, Mm imbalance, Tight hip mm's.    Education Assessment   Preferred Learning Style Listening;Demonstration;Pictures/video   Barriers to Learning No barriers   ORTHO GOALS   PT Ortho Eval Goals 1;2;3;4   Ortho Goal 1   Goal Identifier 1   Goal Description STG: Pt will be able to go from sit to stand w/o P.    Target Date 07/06/17   Ortho Goal 2   Goal Identifier 2   Goal Description STG: Pt will be ann to stand for prolonged periods w/ minimal difficulty.    Target Date 07/06/17   Ortho Goal 3   Goal Identifier 3   Goal Description STG: Pt will report ability to fall asleep has improved.    Target Date 07/06/17   Ortho Goal 4   Goal Identifier 4   Goal Description LTG: Pt will be indepdnent w/ self cares and HEP.    Target Date 08/04/17   Total Evaluation  Time   Total Evaluation Time 60 Total (Eval x 35, Ex x 15, MT x 10)    Romi Chan, PT, [facility] (#[id number])  [facility]           [phone]  Fax          [phone]  Appt #      [phone]

## 2017-06-12 ENCOUNTER — HOSPITAL ENCOUNTER (OUTPATIENT)
Dept: PHYSICAL THERAPY | Facility: CLINIC | Age: 67
Setting detail: THERAPIES SERIES
End: 2017-06-12
Attending: PHYSICAL MEDICINE & REHABILITATION
Payer: COMMERCIAL

## 2017-06-12 PROCEDURE — 40000718 ZZHC STATISTIC PT DEPARTMENT ORTHO VISIT: Performed by: PHYSICAL THERAPIST

## 2017-06-12 PROCEDURE — 97110 THERAPEUTIC EXERCISES: CPT | Mod: GP | Performed by: PHYSICAL THERAPIST

## 2017-06-12 PROCEDURE — 97140 MANUAL THERAPY 1/> REGIONS: CPT | Mod: GP | Performed by: PHYSICAL THERAPIST

## 2017-07-31 NOTE — ADDENDUM NOTE
Encounter addended by: Romi Chan, PT on: 7/31/2017  7:33 AM<BR>     Actions taken: Flowsheet accepted, Sign clinical note, Episode resolved

## 2017-07-31 NOTE — PROGRESS NOTES
Outpatient Physical Therapy Discharge Note     Patient: Angel Hill  : 1950    Beginning/End Dates of Reporting Period:  17 to 17 when last seen. D/C written on 2017.  Total # of Rx sessions: 2/     Referring Provider: Delmar Blackmon Diagnosis: L SI Jt dysfunction and Mm spasms.      Client Self Report: Numbness outside of L Lower leg. Went to chiropractor wednesday and that helped a lot. Hurts L Lateral hip and down leg to mid thigh. Was really sore after last treatment sessions. Going to orthotics today for heel lift for short leg.     Objective Measurements:  Objective Measure: LEFS   Details: INITIALLY: Pt did not fill out properly     Objective Measure: LB ROM   Details: INITIALLY: AYANA - P into HS area, RFIS Pulling outside of L knee, Increased P in LB and L buttock. Flex 50% w/Tight HS and Increased P, Ext 25%, SB B 50%     Objective Measure: Flexibility   Details: INITIALlY: HS's R -55, L -45; Hip Flex Tight B, Piriformis Sign tight B,     Objective Measure: Segments, SI  Details: 17 Normal Alignment.    INITIALLY: ERS R L3, Unable to assess pelvis in prone d/t pt unable to lay flat.      Goals:  Goal Identifier 1   Goal Description STG: Pt will be able to go from sit to stand w/o P.    Target Date 17   Date Met      Progress:     Goal Identifier 2   Goal Description STG: Pt will be ann to stand for prolonged periods w/ minimal difficulty.    Target Date 17   Date Met      Progress:     Goal Identifier 3   Goal Description STG: Pt will report ability to fall asleep has improved.    Target Date 17   Date Met      Progress:     Goal Identifier 4   Goal Description LTG: Pt will be indepdnent w/ self cares and HEP.    Target Date 17   Date Met      Progress:     Progress Toward Goals:   Not assessed this period.    Plan:  Discharge from therapy.    Discharge:    Reason for Discharge: Patient has failed to schedule further  appointments.    Equipment Issued:      Discharge Plan: Patient to continue home program.  Pt to follow up w/MD as appropriate.   Romi Chan, PT, [facility] (#[id number])  [facility]           [phone]  Fax          [phone]  Appt #      [phone]

## 2018-01-09 ENCOUNTER — TELEPHONE (OUTPATIENT)
Dept: FAMILY MEDICINE | Facility: CLINIC | Age: 68
End: 2018-01-09

## 2018-01-09 DIAGNOSIS — Z13.9 SCREENING FOR CONDITION: ICD-10-CM

## 2018-01-09 DIAGNOSIS — Z13.9 SCREENING FOR CONDITION: Primary | ICD-10-CM

## 2018-01-09 LAB — GLUCOSE BLD-MCNC: 297 MG/DL (ref 70–99)

## 2018-01-09 PROCEDURE — 82947 ASSAY GLUCOSE BLOOD QUANT: CPT | Performed by: FAMILY MEDICINE

## 2018-01-09 PROCEDURE — 36415 COLL VENOUS BLD VENIPUNCTURE: CPT | Performed by: FAMILY MEDICINE

## 2018-01-09 NOTE — TELEPHONE ENCOUNTER
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  He is asking that Dr. Umanzor place an order for the this test.      Date needed: at your convenience    Has the patient been seen by the PCP for this problem? NO    Additional comments:     Phone number Patient can be reached at:  Home number on file [phone] (home)    Best Time:  any    Can we leave a detailed message on this number?  YES    Call taken on 1/9/2018 at 4:17 PM by Sonia Arias

## 2018-01-09 NOTE — TELEPHONE ENCOUNTER
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  Order placed. Pt will call for results with where results need to be faxed/mailed.  KpaalvniRN

## 2018-01-10 ENCOUNTER — TELEPHONE (OUTPATIENT)
Dept: FAMILY MEDICINE | Facility: CLINIC | Age: 68
End: 2018-01-10

## 2018-01-10 NOTE — TELEPHONE ENCOUNTER
Reason for Call:  Other call back    Detailed comments: Angel is wondering about his glucose results.  He is out of work until he gets these results.  Please advise.    Phone Number Patient can be reached at: Home number on file [phone] (home)    Best Time: any    Can we leave a detailed message on this number? YES    Call taken on 1/10/2018 at 8:44 AM by Lyn Pichardo

## 2018-01-10 NOTE — TELEPHONE ENCOUNTER
"FYI:  Pt is working on DOT physical with Chiropractor.  Urine test was done there and pt  \"was spilling sugar in his urine\"  Pt requested blood glucose.  Results are abnormal  Pt picked up lab results to give to chiropractor.  Scheduled appt to discuss abnormal BS on 1/12/18 with .  Pt not happy.  Oswadlo  "

## 2018-01-12 ENCOUNTER — OFFICE VISIT (OUTPATIENT)
Dept: FAMILY MEDICINE | Facility: CLINIC | Age: 68
End: 2018-01-12
Payer: COMMERCIAL

## 2018-01-12 ENCOUNTER — TELEPHONE (OUTPATIENT)
Dept: FAMILY MEDICINE | Facility: CLINIC | Age: 68
End: 2018-01-12

## 2018-01-12 VITALS
BODY MASS INDEX: 38.65 KG/M2 | OXYGEN SATURATION: 97 % | HEART RATE: 57 BPM | WEIGHT: 315 LBS | SYSTOLIC BLOOD PRESSURE: 155 MMHG | DIASTOLIC BLOOD PRESSURE: 82 MMHG

## 2018-01-12 DIAGNOSIS — R73.09 ELEVATED GLUCOSE: Primary | ICD-10-CM

## 2018-01-12 LAB
ANION GAP SERPL CALCULATED.3IONS-SCNC: 3 MMOL/L (ref 3–14)
BUN SERPL-MCNC: 17 MG/DL (ref 7–30)
CALCIUM SERPL-MCNC: 9 MG/DL (ref 8.5–10.1)
CHLORIDE SERPL-SCNC: 104 MMOL/L (ref 94–109)
CO2 SERPL-SCNC: 30 MMOL/L (ref 20–32)
CREAT SERPL-MCNC: 0.81 MG/DL (ref 0.66–1.25)
GFR SERPL CREATININE-BSD FRML MDRD: >90 ML/MIN/1.7M2
GLUCOSE SERPL-MCNC: 280 MG/DL (ref 70–99)
HBA1C MFR BLD: 10.8 % (ref 4.3–6)
POTASSIUM SERPL-SCNC: 4.7 MMOL/L (ref 3.4–5.3)
SODIUM SERPL-SCNC: 137 MMOL/L (ref 133–144)

## 2018-01-12 PROCEDURE — 36415 COLL VENOUS BLD VENIPUNCTURE: CPT | Performed by: FAMILY MEDICINE

## 2018-01-12 PROCEDURE — 99213 OFFICE O/P EST LOW 20 MIN: CPT | Performed by: FAMILY MEDICINE

## 2018-01-12 PROCEDURE — 80048 BASIC METABOLIC PNL TOTAL CA: CPT | Performed by: FAMILY MEDICINE

## 2018-01-12 PROCEDURE — 83036 HEMOGLOBIN GLYCOSYLATED A1C: CPT | Performed by: FAMILY MEDICINE

## 2018-01-12 NOTE — NURSING NOTE
"Chief Complaint   Patient presents with     Results     elevated blood glucose       Initial /82 (Cuff Size: Adult Large)  Pulse 57  Wt (!) 326 lb (147.9 kg)  SpO2 97%  BMI 38.65 kg/m2 Estimated body mass index is 38.65 kg/(m^2) as calculated from the following:    Height as of 2/17/17: 6' 5.01\" (1.956 m).    Weight as of this encounter: 326 lb (147.9 kg).  Medication Reconciliation: complete   Nitza Card CMA      "

## 2018-01-12 NOTE — PROGRESS NOTES
SUBJECTIVE:   Angel Hill is a 67 year old male who presents to clinic today for the following health issues:      Chief Complaint   Patient presents with     Results     elevated blood glucose             Problem list and histories reviewed & adjusted, as indicated.  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  He likely has developed diabetes though is here for treatment plan for that.    OBJECTIVE:  /82 (Cuff Size: Adult Large)  Pulse 57  Wt (!) 326 lb (147.9 kg)  SpO2 97%  BMI 38.65 kg/m2  LUNGS: clear to auscultation, normal breath sounds  CV: RRR without murmur  ABD: BS+, soft, nontender, no masses, no hepatosplenomegaly  EXTREMITIES: without joint tenderness, swelling or erythema.  No muscle tenderness or abnormality.  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    Orders Placed This Encounter     Basic metabolic panel     Hemoglobin A1c     DIABETES EDUCATOR REFERRAL

## 2018-01-12 NOTE — LETTER
[facility]  [address]  [facility] [address]  Phone: [phone]      1/15/2018     Angel Hill  [address]   [facility] [address]      Dear Angel:    Thank you for allowing me to participate in your care. Your recent test results were reviewed and listed below.  Elevated A1C indicates diabetes.  Follow plan outlined in clinic note     Your results are provided below for your review  Results for orders placed or performed in visit on 01/12/18   Basic metabolic panel   Result Value Ref Range    Sodium 137 133 - 144 mmol/L    Potassium 4.7 3.4 - 5.3 mmol/L    Chloride 104 94 - 109 mmol/L    Carbon Dioxide 30 20 - 32 mmol/L    Anion Gap 3 3 - 14 mmol/L    Glucose 280 (H) 70 - 99 mg/dL    Urea Nitrogen 17 7 - 30 mg/dL    Creatinine 0.81 0.66 - 1.25 mg/dL    GFR Estimate >90 >60 mL/min/1.7m2    GFR Estimate If Black >90 >60 mL/min/1.7m2    Calcium 9.0 8.5 - 10.1 mg/dL   Hemoglobin A1c   Result Value Ref Range    Hemoglobin A1C 10.8 (H) 4.3 - 6.0 %                 Thank you for choosing [facility]. As a result, please continue with the treatment plan discussed in the office. Return as discussed or sooner if symptoms worsen or fail to improve. If you have any further questions or concerns, please do not hesitate to contact us.      Sincerely,        Dr. Ric Umanzor

## 2018-01-12 NOTE — LETTER
[facility]  [address]  [facility] [address]  [phone]        January 12, 2018    Regarding:  Angel MCCABE Sergio  [address]   [facility] [address]              To Whom It May Concern;    Angel Hill is under care and cooperating with treatment for elevated glucose.  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      Sincerely,              Ric Umanzor MD

## 2018-01-12 NOTE — TELEPHONE ENCOUNTER
Please review his labs when they are processed and we can print a copy for him.Thank you!    Maria A Bartlett RN

## 2018-01-12 NOTE — MR AVS SNAPSHOT
After Visit Summary   1/12/2018    Angel Hill    MRN: [id number]           Patient Information     Date Of Birth          1950        Visit Information        Provider Department      1/12/2018 8:20 AM Ric Umanzor MD [facility]        Today's Diagnoses     Elevated glucose    -  1      Care Instructions          Thank you for choosing [facility].  You may be receiving a survey in the mail from [facility] regarding your visit today.  Please take a few minutes to complete and return the survey to let us know how we are doing.      Our Clinic hours are:  Mondays    7:20 am - 7 pm  Tues -  Fri  7:20 am - 5 pm    Clinic Phone: [phone]    The clinic lab opens at 7:30 am Mon - Fri and appointments are required.    [facility]  Ph. [phone]  Monday-Thursday 8 am - 7pm  Tues/Wed/Fri 8 am - 5:30 pm                 Follow-ups after your visit        Additional Services     DIABETES EDUCATOR REFERRAL       DIABETES SELF MANAGEMENT TRAINING (DSMT)      Your provider has referred you to Diabetes Education: FMG: Diabetes Education - All [facility] [phone]   [email or web address]     If an urgent visit is needed or A1C is above 12, Care Team to call the Diabetes  Education Team at [phone] or send an In Basket message to the Diabetes Education Pool (P DIAB ED-PATIENT CARE).    A  will call you to make your appointment. If it has been more than 3 business days since your referral was placed, please call the above phone number to schedule.    Type of training and number of hours: New Diagnosis: Initial group DSMT - 10 hours.      Medicare covers: 10 hours of initial DSMT in 12 month period from the time of first visit, plus 2 hours of follow-up DSMT annually, and additional hours as requested for insulin training.    Diabetes Type: Type 2 - Diet Control             Diabetes  Co-Morbidities: hypertension               A1C Goal:  <7.0       A1C is: Lab Results       Component                Value               Date                       A1C                      5.9                 11/30/2009              Diabetes Education Topics: Comprehensive Knowledge Assessment and Instruction    Special Educational Needs Requiring Individual DSMT:        MEDICAL NUTRITION THERAPY (MNT) for Diabetes    Medical Nutrition Therapy with a Registered Dietitian can be provided in coordination with Diabetes Self-Management Training to assist in achieving optimal diabetes management.    MNT Type and Hours:                        Medicare will cover: 3 hours initial MNT in 12 month period after first visit, plus 2 hours of follow-up MNT annually    Please be aware that coverage of these services is subject to the terms and limitations of your health insurance plan.  Call member services at your health plan to determine Diabetes Self-Management Training (Codes  &amp; ) and Medical Nutrition Therapy (Codes 81490 & 15761) benefits and ask which blood glucose monitor brands are covered by your plan.  Please bring the following with you to your appointment:    (1)  List of current medications   (2)  List of Blood Glucose Monitor brands that are covered by your insurance plan  (3)  Blood Glucose Monitor and log book  (4)   Food records for the 3 days prior to your visit    The Certified Diabetes Educator may make diabetes medication adjustments per the CDE Protocol and Collaborative Practice Agreement.                  Your next 10 appointments already scheduled     Jan 12, 2018  8:20 AM CST   SHORT with Ric Umanz[facility]   [facility]    [address] [address] [phone]              Who to contact     If you have questions or need follow up information about today's clinic visit or your schedule please contact [facility]  "[place] directly at [phone].  Normal or non-critical lab and imaging results will be communicated to you by MyChart, letter or phone within 4 business days after the clinic has received the results. If you do not hear from us within 7 days, please contact the clinic through AIRSIShart or phone. If you have a critical or abnormal lab result, we will notify you by phone as soon as possible.  Submit refill requests through WePay or call your pharmacy and they will forward the refill request to us. Please allow 3 business days for your refill to be completed.          Additional Information About Your Visit        AIRSISharPhloronol Information     WePay lets you send messages to your doctor, view your test results, renew your prescriptions, schedule appointments and more. To sign up, go to [email or web address] . Click on \"Log in\" on the left side of the screen, which will take you to the Welcome page. Then click on \"Sign up Now\" on the right side of the page.     You will be asked to enter the access code listed below, as well as some personal information. Please follow the directions to create your username and password.     Your access code is: [email or web address]  Expires: 2018  8:17 AM     Your access code will  in 90 days. If you need help or a new code, please call your [place] clinic or [phone].        Care EveryWhere ID     This is your Care EveryWhere ID. This could be used by other organizations to access your [place] medical records  [id number]        Your Vitals Were     Pulse Pulse Oximetry BMI (Body Mass Index)             57 97% 38.65 kg/m2          Blood Pressure from Last 3 Encounters:   18 155/82   17 115/46   17 139/72    Weight from Last 3 Encounters:   18 (!) 326 lb (147.9 kg)   17 (!) 336 lb (152.4 kg)   17 (!) 336 lb (152.4 kg)              We Performed the Following     Basic metabolic panel     DIABETES EDUCATOR REFERRAL     Hemoglobin A1c     "      Today's Medication Changes          These changes are accurate as of: 1/12/18  8:18 AM.  If you have any questions, ask your nurse or doctor.               These medicines have changed or have updated prescriptions.        Dose/Directions    metoprolol succinate 200 MG 24 hr tablet   Commonly known as:  TOPROL-XL   This may have changed:  additional instructions   Used for:  Hypertension goal BP (blood pressure) < 140/90        Dose:  200 mg   Take 1 tablet (200 mg) by mouth daily   Quantity:  90 tablet   Refills:  3                Primary Care Provider Office Phone # Fax #    Ric Umanzor [phone] [address] [facility] [address]        Equal Access to Services     [facility] : Hadii monique [place] hadfabriceo So[place]ey, waaxda luqadaha, qaybta kaalmada janyyaemelina, jacquelin garrett . So [facility] [phone].    ATENCIÓN: Si habla español, tiene a medellin disposición servicios gratuitos de asistencia lingüística. Llame al [phone].    We comply with applicable federal civil rights laws and [place] laws. We do not discriminate on the basis of race, color, national origin, age, disability, sex, sexual orientation, or gender identity.            Thank you!     Thank you for choosing [facility]  for your care. Our goal is always to provide you with excellent care. Hearing back from our patients is one way we can continue to improve our services. Please take a few minutes to complete the written survey that you may receive in the mail after your visit with us. Thank you!             Your Updated Medication List - Protect others around you: Learn how to safely use, store and throw away your medicines at www.disposemymeds.org.          This list is accurate as of: 1/12/18  8:18 AM.  Always use your most recent med list.                   Brand Name Dispense Instructions for use Diagnosis    ASPIRIN PO      Take 650 mg by mouth        coenzyme Q-10 200 MG  Caps           fish oil-omega-3 fatty acids 1000 MG capsule      Take 2 g by mouth daily        metoprolol succinate 200 MG 24 hr tablet    TOPROL-XL    90 tablet    Take 1 tablet (200 mg) by mouth daily    Hypertension goal BP (blood pressure) < 140/90       VITAMIN D3 PO      Take 1,000 Units by mouth daily

## 2018-01-12 NOTE — PATIENT INSTRUCTIONS
Thank you for choosing [facility].  You may be receiving a survey in the mail from [facility] regarding your visit today.  Please take a few minutes to complete and return the survey to let us know how we are doing.      Our Clinic hours are:  Mondays    7:20 am - 7 pm  Tues -  Fri  7:20 am - 5 pm    Clinic Phone: [phone]    The clinic lab opens at 7:30 am Mon - Fri and appointments are required.    [facility]. [phone]  Monday-Thursday 8 am - 7pm  Tues/Wed/Fri 8 am - 5:30 pm

## 2018-01-12 NOTE — TELEPHONE ENCOUNTER
Reason for Call:  Other call back    Detailed comments: Pt wants to be able to  a copy of todays labs 1/12/18, today if possible. Please contact him when results come in.    Phone Number Patient can be reached at: Home number on file [phone] (home)    Best Time: any        Call taken on 1/12/2018 at 10:19 AM by Maria A Ingram

## 2018-01-15 ENCOUNTER — TELEPHONE (OUTPATIENT)
Dept: FAMILY MEDICINE | Facility: CLINIC | Age: 68
End: 2018-01-15

## 2018-01-15 ENCOUNTER — ALLIED HEALTH/NURSE VISIT (OUTPATIENT)
Dept: FAMILY MEDICINE | Facility: CLINIC | Age: 68
End: 2018-01-15
Payer: COMMERCIAL

## 2018-01-15 DIAGNOSIS — C61 MALIGNANT NEOPLASM OF PROSTATE (H): ICD-10-CM

## 2018-01-15 DIAGNOSIS — I10 HYPERTENSION GOAL BP (BLOOD PRESSURE) < 140/90: Primary | ICD-10-CM

## 2018-01-15 DIAGNOSIS — C61 MALIGNANT NEOPLASM OF PROSTATE (H): Primary | ICD-10-CM

## 2018-01-15 LAB — PSA SERPL-MCNC: 0.53 UG/L (ref 0–4)

## 2018-01-15 PROCEDURE — 99207 ZZC NO CHARGE NURSE ONLY: CPT

## 2018-01-15 PROCEDURE — 84153 ASSAY OF PSA TOTAL: CPT | Performed by: FAMILY MEDICINE

## 2018-01-15 PROCEDURE — 36415 COLL VENOUS BLD VENIPUNCTURE: CPT | Performed by: FAMILY MEDICINE

## 2018-01-15 NOTE — TELEPHONE ENCOUNTER
Patient would like to have his PSA level drawn due to history. Is it ok to order?     Thank you  Angelika LAINEZ RN

## 2018-01-15 NOTE — MR AVS SNAPSHOT
"              After Visit Summary   1/15/2018    Angel Hill    MRN: [id number]           Patient Information     Date Of Birth          1950        Visit Information        Provider Department      1/15/2018 1:15 PM FL CL RN [facility]        Today's Diagnoses     Hypertension goal BP (blood pressure) < 140/90    -  1       Follow-ups after your visit        Your next 10 appointments already scheduled     Hemal 15, 2018  1:15 PM CST   Nurse Only with FL CL RN   [facility] ([facility])    [address] [address] [address]   [phone]            Feb 14, 2018  1:00 PM CST   Diabetic Education with CL DIABETIC ED RESOURCE   [facility] [facility] ([facility])    [address] [address]   [phone]              Who to contact     If you have questions or need follow up information about today's clinic visit or your schedule please contact [facility] directly at [phone].  Normal or non-critical lab and imaging results will be communicated to you by Chatterbox Labshart, letter or phone within 4 business days after the clinic has received the results. If you do not hear from us within 7 days, please contact the clinic through Chatterbox Labshart or phone. If you have a critical or abnormal lab result, we will notify you by phone as soon as possible.  Submit refill requests through [employer] or call your pharmacy and they will forward the refill request to us. Please allow 3 business days for your refill to be completed.          Additional Information About Your Visit        Chatterbox Labshart Information     [employer] lets you send messages to your doctor, view your test results, renew your prescriptions, schedule appointments and more. To sign up, go to [email or web address] . Click on \"Log in\" on the left side of the screen, which will take you to the Welcome page. Then click on \"Sign up Now\" on " the right side of the page.     You will be asked to enter the access code listed below, as well as some personal information. Please follow the directions to create your username and password.     Your access code is: [email or web address]  Expires: 2018  8:17 AM     Your access code will  in 90 days. If you need help or a new code, please call your [place] clinic or [phone].        Care EveryWhere ID     This is your Care EveryWhere ID. This could be used by other organizations to access your [place] medical records  [id number]         Blood Pressure from Last 3 Encounters:   18 155/82   17 115/46   17 139/72    Weight from Last 3 Encounters:   18 (!) 326 lb (147.9 kg)   17 (!) 336 lb (152.4 kg)   17 (!) 336 lb (152.4 kg)              Today, you had the following     No orders found for display         Today's Medication Changes          These changes are accurate as of: 1/15/18 12:15 PM.  If you have any questions, ask your nurse or doctor.               These medicines have changed or have updated prescriptions.        Dose/Directions    metoprolol succinate 200 MG 24 hr tablet   Commonly known as:  TOPROL-XL   This may have changed:  additional instructions   Used for:  Hypertension goal BP (blood pressure) < 140/90        Dose:  200 mg   Take 1 tablet (200 mg) by mouth daily   Quantity:  90 tablet   Refills:  3                Primary Care Provider Office Phone # Fax #    Ric Umanzor [phone] [address] [address]        Equal Access to Services     [facility]: Hadii monique Block, waarnoldo [place], qaybjacquelin lazcano. So [facility] [phone].    ATENCIÓN: Si habla español, tiene a medellin disposición servicios gratuitos de asistencia lingüística. Sandi al [phone].    We comply with applicable federal civil rights laws and [place] laws. We do not discriminate  on the basis of race, color, national origin, age, disability, sex, sexual orientation, or gender identity.            Thank you!     Thank you for choosing [facility]  for your care. Our goal is always to provide you with excellent care. Hearing back from our patients is one way we can continue to improve our services. Please take a few minutes to complete the written survey that you may receive in the mail after your visit with us. Thank you!             Your Updated Medication List - Protect others around you: Learn how to safely use, store and throw away your medicines at www.disposemymeds.org.          This list is accurate as of: 1/15/18 12:15 PM.  Always use your most recent med list.                   Brand Name Dispense Instructions for use Diagnosis    ASPIRIN PO      Take 650 mg by mouth        coenzyme Q-10 200 MG Caps           fish oil-omega-3 fatty acids 1000 MG capsule      Take 2 g by mouth daily        metoprolol succinate 200 MG 24 hr tablet    TOPROL-XL    90 tablet    Take 1 tablet (200 mg) by mouth daily    Hypertension goal BP (blood pressure) < 140/90       VITAMIN D3 PO      Take 1,000 Units by mouth daily

## 2018-01-16 ENCOUNTER — OFFICE VISIT (OUTPATIENT)
Dept: UROLOGY | Facility: CLINIC | Age: 68
End: 2018-01-16
Payer: COMMERCIAL

## 2018-01-16 VITALS — SYSTOLIC BLOOD PRESSURE: 150 MMHG | DIASTOLIC BLOOD PRESSURE: 79 MMHG | RESPIRATION RATE: 16 BRPM | HEART RATE: 60 BPM

## 2018-01-16 DIAGNOSIS — N32.0 BLADDER OUTLET OBSTRUCTION: Primary | ICD-10-CM

## 2018-01-16 LAB
ALBUMIN UR-MCNC: NEGATIVE MG/DL
APPEARANCE UR: CLEAR
BILIRUB UR QL STRIP: NEGATIVE
CASTS #/AREA URNS LPF: ABNORMAL /LPF (ref 0–2)
COLOR UR AUTO: YELLOW
GLUCOSE UR STRIP-MCNC: >=1000 MG/DL
HGB UR QL STRIP: NEGATIVE
KETONES UR STRIP-MCNC: NEGATIVE MG/DL
LEUKOCYTE ESTERASE UR QL STRIP: NEGATIVE
NITRATE UR QL: NEGATIVE
NON-SQ EPI CELLS #/AREA URNS LPF: ABNORMAL /LPF
PH UR STRIP: 5 PH (ref 5–7)
RBC #/AREA URNS AUTO: ABNORMAL /HPF
SOURCE: ABNORMAL
SP GR UR STRIP: 1.02 (ref 1–1.03)
UROBILINOGEN UR STRIP-ACNC: 0.2 EU/DL (ref 0.2–1)
WBC #/AREA URNS AUTO: ABNORMAL /HPF

## 2018-01-16 PROCEDURE — 87086 URINE CULTURE/COLONY COUNT: CPT | Performed by: UROLOGY

## 2018-01-16 PROCEDURE — 99214 OFFICE O/P EST MOD 30 MIN: CPT | Mod: 25 | Performed by: UROLOGY

## 2018-01-16 PROCEDURE — 51798 US URINE CAPACITY MEASURE: CPT | Performed by: UROLOGY

## 2018-01-16 PROCEDURE — 81001 URINALYSIS AUTO W/SCOPE: CPT | Performed by: UROLOGY

## 2018-01-16 RX ORDER — TAMSULOSIN HYDROCHLORIDE 0.4 MG/1
0.4 CAPSULE ORAL DAILY
Qty: 30 CAPSULE | Refills: 1 | Status: SHIPPED | OUTPATIENT
Start: 2018-01-16 | End: 2018-02-06

## 2018-01-16 NOTE — MR AVS SNAPSHOT
"              After Visit Summary   1/16/2018    Angel Hill    MRN: [id number]           Patient Information     Date Of Birth          1950        Visit Information        Provider Department      1/16/2018 11:30 AM RACHELE Nino MD [facility]        Today's Diagnoses     Bladder outlet obstruction    -  1       Follow-ups after your visit        Your next 10 appointments already scheduled     Feb 06, 2018 10:00 AM CST   Return Visit with RACHELE Nino MD   [facility] ([facility]    [address] [address] [phone]            Feb 14, 2018  1:00 PM CST   Diabetic Education with  DIABETIC ED RESOURCE   [place] [facility] ([facility])    [address]  [facility] [address] [phone]              Who to contact     If you have questions or need follow up information about today's clinic visit or your schedule please contact [facility] directly at [phone].  Normal or non-critical lab and imaging results will be communicated to you by Plaidhart, letter or phone within 4 business days after the clinic has received the results. If you do not hear from us within 7 days, please contact the clinic through Plaidhart or phone. If you have a critical or abnormal lab result, we will notify you by phone as soon as possible.  Submit refill requests through [employer] or call your pharmacy and they will forward the refill request to us. Please allow 3 business days for your refill to be completed.          Additional Information About Your Visit        Plaidhart Information     [employer] lets you send messages to your doctor, view your test results, renew your prescriptions, schedule appointments and more. To sign up, go to [email or web address] . Click on \"Log in\" on the left side of the screen, which will take you to the Welcome page. Then click on \"Sign up Now\" on the right side of " the page.     You will be asked to enter the access code listed below, as well as some personal information. Please follow the directions to create your username and password.     Your access code is: [email or web address]  Expires: 2018  8:17 AM     Your access code will  in 90 days. If you need help or a new code, please call your [place] clinic or [phone].        Care EveryWhere ID     This is your Care EveryWhere ID. This could be used by other organizations to access your [place] medical records  [id number]        Your Vitals Were     Pulse Respirations                60 16           Blood Pressure from Last 3 Encounters:   18 150/79   18 155/82   17 115/46    Weight from Last 3 Encounters:   18 (!) 147.9 kg (326 lb)   17 (!) 152.4 kg (336 lb)   17 (!) 152.4 kg (336 lb)              We Performed the Following     MEASURE POST-VOID RESIDUAL URINE/BLADDER CAPACITY, US NON-IMAGING     UA with Microscopic     Urine Culture Aerobic Bacterial          Today's Medication Changes          These changes are accurate as of: 18 11:59 PM.  If you have any questions, ask your nurse or doctor.               Start taking these medicines.        Dose/Directions    tamsulosin 0.4 MG capsule   Commonly known as:  FLOMAX   Used for:  Bladder outlet obstruction   Started by:  RACHELE Nino MD        Dose:  0.4 mg   Take 1 capsule (0.4 mg) by mouth daily   Quantity:  30 capsule   Refills:  1         These medicines have changed or have updated prescriptions.        Dose/Directions    metoprolol succinate 200 MG 24 hr tablet   Commonly known as:  TOPROL-XL   This may have changed:  additional instructions   Used for:  Hypertension goal BP (blood pressure) < 140/90        Dose:  200 mg   Take 1 tablet (200 mg) by mouth daily   Quantity:  90 tablet   Refills:  3            Where to get your medicines      These medications were sent to [facility] [place]  - [address]  [address] [place] [address]    Hours:  [facility] [place] [facility] [employer] Phone:  [phone]     tamsulosin 0.4 MG capsule                Primary Care Provider Office Phone # Fax #    Ric Umanzor [phone] [address]  [facility] [address]        Equal Access to Services     [facility]: Hadii aad ku hadasho Soomaali, waaxda luqadaha, qaybta kaalmada adeegyada, waxay idiin hayaan adeeg kharash la'aan ah. So [facility] [phone].    ATENCIÓN: Si habla esprosie, tiene a medellin disposición servicios gratuitos de asistencia lingüística. Llame al [phone].    We comply with applicable federal civil rights laws and [place] laws. We do not discriminate on the basis of race, color, national origin, age, disability, sex, sexual orientation, or gender identity.            Thank you!     Thank you for choosing [facility]  for your care. Our goal is always to provide you with excellent care. Hearing back from our patients is one way we can continue to improve our services. Please take a few minutes to complete the written survey that you may receive in the mail after your visit with us. Thank you!             Your Updated Medication List - Protect others around you: Learn how to safely use, store and throw away your medicines at www.disposemymeds.org.          This list is accurate as of: 1/16/18 11:59 PM.  Always use your most recent med list.                   Brand Name Dispense Instructions for use Diagnosis    ASPIRIN PO      Take 650 mg by mouth        coenzyme Q-10 200 MG Caps           fish oil-omega-3 fatty acids 1000 MG capsule      Take 2 g by mouth daily        metoprolol succinate 200 MG 24 hr tablet    TOPROL-XL    90 tablet    Take 1 tablet (200 mg) by mouth daily    Hypertension goal BP (blood pressure) < 140/90       tamsulosin 0.4 MG capsule    FLOMAX    30 capsule    Take 1 capsule (0.4 mg) by mouth daily    Bladder outlet obstruction        VITAMIN D3 PO      Take 1,000 Units by mouth daily

## 2018-01-16 NOTE — NURSING NOTE
"Chief Complaint   Patient presents with     Urinary Problem     Is having incontinance, and is getting lower back pain when he is emptying his bladder.       Initial Resp 16 Estimated body mass index is 38.65 kg/(m^2) as calculated from the following:    Height as of 2/17/17: 6' 5.01\" (1.956 m).    Weight as of 1/12/18: 326 lb (147.9 kg).  Medication Reconciliation: complete    "

## 2018-01-16 NOTE — NURSING NOTE
Active order to obtain bladder scan? Yes   Name of ordering provider:  Dr Nino  Bladder scan preformed post void No, pt denied.  Bladder scan reveled 56ML  Provider notified?  Yes    Rae Perez, CMA

## 2018-01-17 LAB
BACTERIA SPEC CULT: NORMAL
Lab: NORMAL
SPECIMEN SOURCE: NORMAL

## 2018-01-17 NOTE — PROGRESS NOTES
Appointment source: Established Patient  Patient name: Angel Hill  Urology Staff: Alex Nino MD    Subjective: This is a 67 year old year old male returning for follow up of voiding symptoms.    Objective:  Having some discomfort while urinating that is described as a dull ache in the back. Also complaining of nocturia.    Also reports recent discovery of elevated glucose and has at least 1000 mg/dl of urine glucose. Management plan for diabetes is being developed.    Post void residual is 56 cc.    Previous testicular pain is essentially resolved.    Plan:  Will start tamsulosin for improvement of his voiding. Return to urology for follow up in about 3 weeks.    Total time 25 minutes, counseling 15 minutes discussing voiding symptoms and diabetes.

## 2018-02-06 ENCOUNTER — OFFICE VISIT (OUTPATIENT)
Dept: UROLOGY | Facility: CLINIC | Age: 68
End: 2018-02-06
Payer: COMMERCIAL

## 2018-02-06 VITALS — SYSTOLIC BLOOD PRESSURE: 140 MMHG | HEART RATE: 92 BPM | DIASTOLIC BLOOD PRESSURE: 91 MMHG | RESPIRATION RATE: 16 BRPM

## 2018-02-06 DIAGNOSIS — N32.0 BLADDER OUTLET OBSTRUCTION: Primary | ICD-10-CM

## 2018-02-06 PROCEDURE — 99213 OFFICE O/P EST LOW 20 MIN: CPT | Mod: 25 | Performed by: UROLOGY

## 2018-02-06 PROCEDURE — 51798 US URINE CAPACITY MEASURE: CPT | Performed by: UROLOGY

## 2018-02-06 RX ORDER — TAMSULOSIN HYDROCHLORIDE 0.4 MG/1
0.4 CAPSULE ORAL DAILY
Qty: 90 CAPSULE | Refills: 3 | Status: SHIPPED | OUTPATIENT
Start: 2018-02-06 | End: 2019-08-19

## 2018-02-06 NOTE — NURSING NOTE
"Chief Complaint   Patient presents with     Urinary Problem     3 week check       Initial BP (!) 140/91  Pulse 92  Resp 16 Estimated body mass index is 38.65 kg/(m^2) as calculated from the following:    Height as of 2/17/17: 6' 5.01\" (1.956 m).    Weight as of 1/12/18: 326 lb (147.9 kg).  Medication Reconciliation: complete    "

## 2018-02-06 NOTE — PROGRESS NOTES
Appointment source: Established Patient  Patient name: Angel Hill  Urology Staff: Alex Nino MD    Subjective: This is a 67 year old year old male returning for follow up of mild voiding symptoms.    Objective:  Tamsulosin reduced his urgency and frequency to his satisfaction.    Plan:  Return in 6 months for follow up.    Total time 15 minutes, counseling 10 minutes discussing bladder outlet obstruction symptoms.

## 2018-02-06 NOTE — MR AVS SNAPSHOT
"              After Visit Summary   2/6/2018    Angel Hill    MRN: [id number]           Patient Information     Date Of Birth          1950        Visit Information        Provider Department      2/6/2018 10:00 AM RACHELE Nino MD [facility]        Today's Diagnoses     Bladder outlet obstruction    -  1       Follow-ups after your visit        Your next 10 appointments already scheduled     Feb 14, 2018  1:00 PM CST   Diabetic Education with CL DIABETIC ED RESOURCE   [facility])    [address]  [facility] [address]   [phone]              Who to contact     If you have questions or need follow up information about today's clinic visit or your schedule please contact [facility] directly at [phone].  Normal or non-critical lab and imaging results will be communicated to you by MyChart, letter or phone within 4 business days after the clinic has received the results. If you do not hear from us within 7 days, please contact the clinic through MyChart or phone. If you have a critical or abnormal lab result, we will notify you by phone as soon as possible.  Submit refill requests through [employer] or call your pharmacy and they will forward the refill request to us. Please allow 3 business days for your refill to be completed.          Additional Information About Your Visit        MyChart Information     [employer] lets you send messages to your doctor, view your test results, renew your prescriptions, schedule appointments and more. To sign up, go to [email or web address] . Click on \"Log in\" on the left side of the screen, which will take you to the Welcome page. Then click on \"Sign up Now\" on the right side of the page.     You will be asked to enter the access code listed below, as well as some personal information. Please follow the directions to create your username and password.     Your access code " is: [email or web address]  Expires: 2018  8:17 AM     Your access code will  in 90 days. If you need help or a new code, please call your [place] [place] clinic or [phone].        Care EveryWhere ID     This is your Care EveryWhere ID. This could be used by other organizations to access your [facility] medical records  [id number]        Your Vitals Were     Pulse Respirations                92 16           Blood Pressure from Last 3 Encounters:   18 (!) 140/91   18 150/79   18 155/82    Weight from Last 3 Encounters:   18 (!) 147.9 kg (326 lb)   17 (!) 152.4 kg (336 lb)   17 (!) 152.4 kg (336 lb)              We Performed the Following     MEASURE POST-VOID RESIDUAL URINE/BLADDER CAPACITY, US NON-IMAGING          Today's Medication Changes          These changes are accurate as of 18 10:25 AM.  If you have any questions, ask your nurse or doctor.               These medicines have changed or have updated prescriptions.        Dose/Directions    metoprolol succinate 200 MG 24 hr tablet   Commonly known as:  TOPROL-XL   This may have changed:  additional instructions   Used for:  Hypertension goal BP (blood pressure) < 140/90        Dose:  200 mg   Take 1 tablet (200 mg) by mouth daily   Quantity:  90 tablet   Refills:  3            Where to get your medicines      These medications were sent to [facility] [place] - [address]  [address], [place] [address]    Hours:  [facility] Phone:  [phone]     tamsulosin 0.4 MG capsule                Primary Care Provider Office Phone # Fax #    Ric Umanzor [phone] [address] [facility] [address]        Equal Access to Services     [facility]: Stone kolbo Soobey, waaxda luqadaha, qaybta kaalmada adeegyada, waxay desire werner. [facility] [phone].    ATENCIÓN: Si latasha freire a medellin disposición  servicios gratuitos de asistencia lingüística. Sandi melvin [phone].    We comply with applicable federal civil rights laws and [place] laws. We do not discriminate on the basis of race, color, national origin, age, disability, sex, sexual orientation, or gender identity.            Thank you!     Thank you for choosing [facility]  for your care. Our goal is always to provide you with excellent care. Hearing back from our patients is one way we can continue to improve our services. Please take a few minutes to complete the written survey that you may receive in the mail after your visit with us. Thank you!             Your Updated Medication List - Protect others around you: Learn how to safely use, store and throw away your medicines at www.disposemymeds.org.          This list is accurate as of 2/6/18 10:25 AM.  Always use your most recent med list.                   Brand Name Dispense Instructions for use Diagnosis    ASPIRIN PO      Take 650 mg by mouth        coenzyme Q-10 200 MG Caps           fish oil-omega-3 fatty acids 1000 MG capsule      Take 2 g by mouth daily        metoprolol succinate 200 MG 24 hr tablet    TOPROL-XL    90 tablet    Take 1 tablet (200 mg) by mouth daily    Hypertension goal BP (blood pressure) < 140/90       tamsulosin 0.4 MG capsule    FLOMAX    90 capsule    Take 1 capsule (0.4 mg) by mouth daily    Bladder outlet obstruction       VITAMIN D3 PO      Take 1,000 Units by mouth daily

## 2018-02-06 NOTE — NURSING NOTE
Active order to obtain bladder scan? Yes   Name of ordering provider:    Bladder scan preformed post void No.   Provider notified?  Yes    Rosa ROONEY CMA

## 2018-02-13 ENCOUNTER — HOSPITAL ENCOUNTER (EMERGENCY)
Facility: CLINIC | Age: 68
Discharge: HOME | End: 2018-02-13
Attending: PHYSICIAN ASSISTANT | Admitting: PHYSICIAN ASSISTANT
Payer: MEDICARE

## 2018-02-13 VITALS
WEIGHT: 315 LBS | HEART RATE: 73 BPM | OXYGEN SATURATION: 98 % | DIASTOLIC BLOOD PRESSURE: 85 MMHG | TEMPERATURE: 98 F | RESPIRATION RATE: 18 BRPM | SYSTOLIC BLOOD PRESSURE: 154 MMHG | BODY MASS INDEX: 38.53 KG/M2

## 2018-02-13 DIAGNOSIS — Z48.02 ENCOUNTER FOR REMOVAL OF SUTURES: Primary | ICD-10-CM

## 2018-02-13 PROCEDURE — G0463 HOSPITAL OUTPT CLINIC VISIT: HCPCS

## 2018-02-13 PROCEDURE — 99212 OFFICE O/P EST SF 10 MIN: CPT | Performed by: PHYSICIAN ASSISTANT

## 2018-02-13 ASSESSMENT — ENCOUNTER SYMPTOMS
ROS SKIN COMMENTS: SUTURE REMOVAL
CONSTITUTIONAL NEGATIVE: 1

## 2018-02-13 NOTE — ED AVS SNAPSHOT
[facility] Emergency Department    [address] [facility] [address]    Phone:  [phone]    Fax:  [phone]                                       Angel Hill   MRN: [id number]    Department:  [facility]   Date of Visit:  2/13/2018           Patient Information     Date Of Birth          1950        Your diagnoses for this visit were:     Encounter for removal of sutures        You were seen by Kibmerley Dugan PA-C.      Follow-up Information     Call Ric Umanzor MD.    Specialty:  Family Practice    Why:  As needed    Contact information:    [address]  [facility] [address]  [phone]          Follow up with [facility].    Specialty:  EMERGENCY MEDICINE    Why:  As needed, If symptoms worsen    Contact information:    [address] [place] [address] [phone]    Additional information:    The medical center is located at   [address] [place] [address] (between [facility] and   [facility] in [place], four miles north   of [place]).        Discharge Instructions         Suture or Staple Removal  You were seen today for a suture or staple removal. Your wound is healing as expected. The wound has healed well enough that the sutures or staples can be removed. The wound will continue to heal for the next few months.  At this time there is no sign of infection.   Home care    If you have pain, take pain medicine as advised by your healthcare provider.     Keep your wound clean and protected by covering it with a bandage for the next week or so.     Wash your hands with soap and warm water before and after caring for your wound. This helps prevent infection.    Clean the wound gently with soap and warm water daily or as directed by your child s health care provider. Do not use iodine, alcohol, or other cleansers on the wound.  Gently pat it dry. Put on a new bandage, if needed. Do not reuse bandages.    If the area  "gets wet, gently pat it dry with a clean cloth. Replace the wet bandage with a dry one.    Check the wound daily for signs of infection. (These are listed under \"When to seek medical advice\" below.)    You may shower and bathe as usual. Swimming is now permitted.  Follow-up care  Follow up with your healthcare provider as advised.  When to seek medical advice   Call your healthcare provider if any of the following occur:    Wound reopens or bleeds    Signs of an infection, such as:    Increasing redness or swelling around the wound    Increased warmth from the wound    Worsening pain    Red streaking lines away from the wound    Fluid draining from the wound    Fever of 100.4 F (38 C) or higher, or as directed by your child's healthcare provider  Date Last Reviewed: 9/27/2015 2000-2017 [employer]. [address] [place] [address]. All rights reserved. This information is not intended as a substitute for professional medical care. Always follow your healthcare professional's instructions.          Future Appointments        Provider Department Dept Phone Center    2/14/2018 1:00 PM CL Diabetic [facility] [place] [facility] [phone] [facility]    8/6/2018 1:00 PM RACHELE Nino MD, MD [facility] [phone] [facility]      24 Hour Appointment Hotline       To make an appointment at any [facility], call [phone] ([phone]). If you don't have a family doctor or clinic, we will help you find one. [facility] are conveniently located to serve the needs of you and your family.             Review of your medicines      Our records show that you are taking the medicines listed below. If these are incorrect, please call your family doctor or clinic.        Dose / Directions Last dose taken    ASPIRIN PO   Dose:  650 mg        Take 650 mg by mouth   Refills:  0        coenzyme Q-10 200 MG Caps        Refills:  0        fish oil-omega-3 fatty acids 1000 MG capsule " "  Dose:  2 g        Take 2 g by mouth daily   Refills:  0        metoprolol succinate 200 MG 24 hr tablet   Commonly known as:  TOPROL-XL   Dose:  200 mg   Quantity:  90 tablet        Take 1 tablet (200 mg) by mouth daily   Refills:  3        tamsulosin 0.4 MG capsule   Commonly known as:  FLOMAX   Dose:  0.4 mg   Quantity:  90 capsule        Take 1 capsule (0.4 mg) by mouth daily   Refills:  3        VITAMIN D3 PO   Dose:  1000 Units        Take 1,000 Units by mouth daily   Refills:  0                Orders Needing Specimen Collection     None      Pending Results     No orders found from 2/11/2018 to 2/14/2018.            Pending Culture Results     No orders found from 2/11/2018 to 2/14/2018.            Pending Results Instructions     If you had any lab results that were not finalized at the time of your Discharge, you can call the ED Lab Result RN at [phone]. You will be contacted by this team for any positive Lab results or changes in treatment. The nurses are available 7 days a week from 10A to 6:30P.  You can leave a message 24 hours per day and they will return your call.        Test Results From Your Hospital Stay               Thank you for choosing [facility]       Thank you for choosing [facility] for your care. Our goal is always to provide you with excellent care. Hearing back from our patients is one way we can continue to improve our services. Please take a few minutes to complete the written survey that you may receive in the mail after you visit with us. Thank you!        [employer] Information     [employer] lets you send messages to your doctor, view your test results, renew your prescriptions, schedule appointments and more. To sign up, go to [email or web address] . Click on \"Log in\" on the left side of the screen, which will take you to the Welcome page. Then click on \"Sign up Now\" on the right side of the page.     You will be asked to enter the access code listed below, as well as some " personal information. Please follow the directions to create your username and password.     Your access code is: [email or web address]  Expires: 2018  8:17 AM     Your access code will  in 90 days. If you need help or a new code, please call your [facility] clinic or [phone].        Care EveryWhere ID     This is your Care EveryWhere ID. This could be used by other organizations to access your [facility] medical records  [id number]        Equal Access to Services     [facility] JENN : Stone ramirez Soobey, waaxda luqadaha, qaybta kaalmada mary, jacquelin garrett . [facility] [phone].    ATENCIÓN: Si habla español, tiene a medellin disposición servicios gratuitos de asistencia lingüística. Llame al [phone].    We comply with applicable federal civil rights laws and [place] laws. We do not discriminate on the basis of race, color, national origin, age, disability, sex, sexual orientation, or gender identity.            After Visit Summary       This is your record. Keep this with you and show to your community pharmacist(s) and doctor(s) at your next visit.

## 2018-02-13 NOTE — ED AVS SNAPSHOT
[facility] Emergency Department    [address] [facility] [address]    Phone:  [phone]    Fax:  [phone]                                       Angel Hill   MRN: [id number]    Department:  [facility]   Date of Visit:  2/13/2018           After Visit Summary Signature Page     I have received my discharge instructions, and my questions have been answered. I have discussed any challenges I see with this plan with the nurse or doctor.    ..........................................................................................................................................  Patient/Patient Representative Signature      ..........................................................................................................................................  Patient Representative Print Name and Relationship to Patient    ..................................................               ................................................  Date                                            Time    ..........................................................................................................................................  Reviewed by Signature/Title    ...................................................              ..............................................  Date                                                            Time

## 2018-02-13 NOTE — DISCHARGE INSTRUCTIONS
"  Suture or Staple Removal  You were seen today for a suture or staple removal. Your wound is healing as expected. The wound has healed well enough that the sutures or staples can be removed. The wound will continue to heal for the next few months.  At this time there is no sign of infection.   Home care    If you have pain, take pain medicine as advised by your healthcare provider.     Keep your wound clean and protected by covering it with a bandage for the next week or so.     Wash your hands with soap and warm water before and after caring for your wound. This helps prevent infection.    Clean the wound gently with soap and warm water daily or as directed by your child s health care provider. Do not use iodine, alcohol, or other cleansers on the wound.  Gently pat it dry. Put on a new bandage, if needed. Do not reuse bandages.    If the area gets wet, gently pat it dry with a clean cloth. Replace the wet bandage with a dry one.    Check the wound daily for signs of infection. (These are listed under \"When to seek medical advice\" below.)    You may shower and bathe as usual. Swimming is now permitted.  Follow-up care  Follow up with your healthcare provider as advised.  When to seek medical advice   Call your healthcare provider if any of the following occur:    Wound reopens or bleeds    Signs of an infection, such as:    Increasing redness or swelling around the wound    Increased warmth from the wound    Worsening pain    Red streaking lines away from the wound    Fluid draining from the wound    Fever of 100.4 F (38 C) or higher, or as directed by your child's healthcare provider  Date Last Reviewed: 9/27/2015 2000-2017 [employer]. [address] [place] [address], [place] [address]. All rights reserved. This information is not intended as a substitute for professional medical care. Always follow your healthcare professional's instructions.        "

## 2018-02-13 NOTE — ED PROVIDER NOTES
History     Chief Complaint   Patient presents with     Suture Removal     HPI  Angel Hill is a 67 year old male who presents for suture removal.  Patient reports that he had sutures placed to his right eyebrow a week ago while he was in [place].  Patient is a .  He states he accidentally struck himself in the face with a pipe and this is how he sustained a laceration.  He states he is currently on antibiotics for this laceration but is unsure what he is taking.  He states he has developed a large scab across his sutures but has not noticed any signs of infection.  Patient denies any fevers or chills.  He has no complaints.        Problem List:    Patient Active Problem List    Diagnosis Date Noted     Osteoarthritis of left hip, unspecified osteoarthritis type 09/06/2016     Priority: Medium     Hypertension goal BP (blood pressure) < 140/90 09/24/2013     Priority: Medium     Advanced directives, counseling/discussion 09/14/2012     Priority: Medium     Patient does not have an Advance/Health Care Directive (HCD), declines information/referral.    Nitza Valderrama  September 14, 2012         CARDIOVASCULAR SCREENING; LDL GOAL LESS THAN 130 10/31/2010     Priority: Medium     PROSTATE CARCINOMA 01/14/2008     Priority: Medium     Obesity 01/14/2008     Priority: Medium     Problem list name updated by automated process. Provider to review       Melanoma of skin (H) 12/02/2005     Priority: Medium     melanoma  Problem list name updated by automated process. Provider to review       Urethritis 12/02/2005     Priority: Medium     ulcer 20 + years ago  Problem list name updated by automated process. Provider to review          Past Medical History:    Past Medical History:   Diagnosis Date     Melanoma of skin, site unspecified      Urethritis, unspecified        Past Surgical History:    Past Surgical History:   Procedure Laterality Date     ABLATE VEIN VARICOSE RADIO FREQUENCY WITHOUT PHLEBECTOMY  MULTIPLE STAB  9/20/2012    Procedure: ABLATE VEIN VARICOSE RADIO FREQUENCY WITHOUT PHLEBECTOMY MULTIPLE STAB;  Right Leg VNUS Ablation, Right ankle ulcer debridement;  Surgeon: Jordan Lopez MD;  Location: [facility]     COLONOSCOPY  9/19/2002     COLONOSCOPY N/A 2/23/2017    Procedure: COLONOSCOPY;  Surgeon: Georges Kiser MD;  Location: [facility] GI     ESOPHAGOSCOPY, GASTROSCOPY, DUODENOSCOPY (EGD), COMBINED N/A 2/23/2017    Procedure: COMBINED ESOPHAGOSCOPY, GASTROSCOPY, DUODENOSCOPY (EGD), BIOPSY SINGLE OR MULTIPLE;  Surgeon: Georges Kiser MD;  Location: [facility]     SURGICAL HISTORY OF -       cyst removed from face     SURGICAL HISTORY OF -       imapacted wisdom teeth     SURGICAL HISTORY OF -   1997    hammer toe     SURGICAL HISTORY OF -   7/01    metastalic melanoma     SURGICAL HISTORY OF -   9/01    left axillary node removal     SURGICAL HISTORY OF -   4/08    prostate cryoablation       Family History:    Family History   Problem Relation Age of Onset     Musculoskeletal Disorder Mother      CANCER Father      Hypertension Maternal Grandmother      HEART DISEASE Maternal Grandfather      HEART DISEASE Paternal Grandmother      HEART DISEASE Paternal Grandfather      Musculoskeletal Disorder Sister      foot problems     Unknown/Adopted Sister      Unknown/Adopted Brother        Social History:  Marital Status:  Single [1]  Social History   Substance Use Topics     Smoking status: Former Smoker     Quit date: 1/1/1988     Smokeless tobacco: Former User      Comment: 1980     Alcohol use Yes      Comment: 2 beers once a week        Medications:      tamsulosin (FLOMAX) 0.4 MG capsule   Cholecalciferol (VITAMIN D3 PO)   ASPIRIN PO   metoprolol (TOPROL-XL) 200 MG 24 hr tablet   coenzyme Q-10 200 MG CAPS   fish oil-omega-3 fatty acids (OMEGA 3) 1000 MG capsule         Review of Systems   Constitutional: Negative.    Skin:        Suture removal   All other systems reviewed and are negative.      Physical Exam    BP: 154/85  Pulse: 73  Temp: 98  F (36.7  C)  Resp: 18  Weight: (!) 147.4 kg (325 lb)  SpO2: 98 %      Physical Exam   Constitutional: He appears well-developed and well-nourished. No distress.   HENT:   Head: Normocephalic and atraumatic.   Scabbed-over wound to right eyebrow with sutures in place.  No surrounding erythema, warmth, tenderness, or drainage.   Eyes: Conjunctivae and EOM are normal. Pupils are equal, round, and reactive to light.   Neurological: He is alert.   Skin: Skin is warm and dry.       ED Course     ED Course     Suture removal  Date/Time: 2/13/2018 4:56 PM  Performed by: JAG PANCHAL  Authorized by: JAG PANCHAL   Consent: Verbal consent obtained.  Risks and benefits: risks, benefits and alternatives were discussed  Consent given by: patient  Patient understanding: patient states understanding of the procedure being performed  Patient identity confirmed: verbally with patient  Body area: head/neck  Location details: forehead  Wound Appearance: clean  Sutures Removed: 5  Post-removal: antibiotic ointment applied  Patient tolerance: Patient tolerated the procedure well with no immediate complications        Assessments & Plan (with Medical Decision Making)     Pt is a 67 year old male who presents for suture removal.  Patient reports that he had sutures placed to his right eyebrow a week ago while he was in [place].  Patient is a .  He states he accidentally struck himself in the face with a pipe and this is how he sustained a laceration.  He states he has developed a large scab across his sutures but has not noticed any signs of infection.  He has no complaints.  Pt is afebrile on arrival.  Exam as above.  Sutures removed without complication (see above procedure note).  No evidence of infection on exam.  Hand-outs provided.    Patient was instructed to follow-up with PCP as needed for continued care and management or sooner if new or worsening symptoms.  He is to return  to the ED for persistent and/or worsening symptoms.  Patient expressed understanding of the diagnosis and plan and was discharged home in good condition.    I have reviewed the nursing notes.    I have reviewed the findings, diagnosis, plan and need for follow up with the patient.    Discharge Medication List as of 2/13/2018  4:56 PM          Final diagnoses:   Encounter for removal of sutures       2/13/2018   [facility] EMERGENCY DEPARTMENT     Kimberley Dugan PA-C  02/13/18 [id number]

## 2018-02-14 ENCOUNTER — ALLIED HEALTH/NURSE VISIT (OUTPATIENT)
Dept: EDUCATION SERVICES | Facility: CLINIC | Age: 68
End: 2018-02-14
Payer: COMMERCIAL

## 2018-02-14 DIAGNOSIS — E11.9 DIABETES MELLITUS, TYPE 2 (H): Primary | ICD-10-CM

## 2018-02-14 DIAGNOSIS — E11.9 DIABETES MELLITUS WITHOUT COMPLICATION (H): ICD-10-CM

## 2018-02-14 PROCEDURE — G0108 DIAB MANAGE TRN  PER INDIV: HCPCS

## 2018-02-14 RX ORDER — METFORMIN HCL 500 MG
1000 TABLET, EXTENDED RELEASE 24 HR ORAL 2 TIMES DAILY WITH MEALS
Qty: 360 TABLET | Refills: 1 | Status: SHIPPED | OUTPATIENT
Start: 2018-02-14 | End: 2018-08-02

## 2018-02-14 NOTE — MR AVS SNAPSHOT
After Visit Summary   2/14/2018    Angel Hill    MRN: [id number]           Patient Information     Date Of Birth          1950        Visit Information        Provider Department      2/14/2018 1:00 PM  DIABETIC ED RESOURCE [facility]        Today's Diagnoses     Diabetes mellitus, type 2 (H)    -  1      Care Instructions    My Diabetes Care Goals:    Healthy Eating: eat Breakfast , lunch and dinner.    Breakfast:  Cheerios, or toast and eggs, or ham and cheese sandwich.    Lunch:  [place], or soups and some crackers.  Can have spaghetti, soft meats, some fish, shrimp, eggs Hard boiled.    Dinner:  Can have fried eggs, can have fish , can have ham or ribs, small baked potato, green beans, grits are ok,   Snacks:  Cheese is great , small amount of ice cream, eat greek yogurt.  Try the vanilla taste without fruit.      Being Active: walking from your truck to the building    Monitoring: check your BG every morning    Taking Medication: Metformin  mg take 1 tabs with breakfast and 1 tablet with dinner for 1 wk.    Next week take 2 pills with breakfast and 2 pills with dinner    Follow up:  Follow up with Dr. Umanzor in a few months to check your BG.       Bring blood glucose meter and logbook with you to all doctor and follow-up appointments.     [facility] the [facility]:  For Your Diabetes Education and Nutrition Appointments Call:  [phone]   For Diabetes Education or Nutrition Related Questions:   Phone: [phone]  E-mail: [email or web address]  Fax: [phone]   If you need a medication refill please contact your pharmacy. Please allow 3 business days for your refills to be completed.    Instructions for emailing the Diabetes Educators    If you need to communicate a non-urgent message to a Diabetes Educator via email, please send to [email or web address].    Please follow the following email  "guidelines:    Subject line: Secure: your clinic name (example: Secure: [facility])  In the email please include: First name, middle initial, last name and date of birth.    We will be in touch with you within one (1) business day.             Follow-ups after your visit        Your next 10 appointments already scheduled     Aug 06, 2018  1:00 PM CDT   Return Visit [facility]   [facility]    [address] [address]   [phone]              Who to contact     If you have questions or need follow up information about today's clinic visit or your schedule please contact [facility] directly at [phone].  Normal or non-critical lab and imaging results will be communicated to you by MyChart, letter or phone within 4 business days after the clinic has received the results. If you do not hear from us within 7 days, please contact the clinic through MyChart or phone. If you have a critical or abnormal lab result, we will notify you by phone as soon as possible.  Submit refill requests through [employer] or call your pharmacy and they will forward the refill request to us. Please allow 3 business days for your refill to be completed.          Additional Information About Your Visit        Site [employer] Interactive Experience Information     [employer] lets you send messages to your doctor, view your test results, renew your prescriptions, schedule appointments and more. To sign up, go to [email or web address] . Click on \"Log in\" on the left side of the screen, which will take you to the Welcome page. Then click on \"Sign up Now\" on the right side of the page.     You will be asked to enter the access code listed below, as well as some personal information. Please follow the directions to create your username and password.     Your access code is: [email or web address]  Expires: 2018  8:17 AM     Your access code will  in 90 days. If you need help or a " new code, please call your [facility] clinic or [phone].        Care EveryWhere ID     This is your Care EveryWhere ID. This could be used by other organizations to access your [facility] medical records  [id number]         Blood Pressure from Last 3 Encounters:   02/13/18 154/85   02/06/18 (!) 140/91   01/16/18 150/79    Weight from Last 3 Encounters:   02/13/18 (!) 147.4 kg (325 lb)   01/12/18 (!) 147.9 kg (326 lb)   02/17/17 (!) 152.4 kg (336 lb)              Today, you had the following     No orders found for display         Today's Medication Changes          These changes are accurate as of 2/14/18  2:03 PM.  If you have any questions, ask your nurse or doctor.               Start taking these medicines.        Dose/Directions    blood glucose monitoring lancets   Used for:  Diabetes mellitus, type 2 (H)        Use to test blood sugar 4 times daily or as directed.   Quantity:  100 each   Refills:  11       blood glucose monitoring test strip   Commonly known as:  SHARATH CONTOUR NEXT   Used for:  Diabetes mellitus, type 2 (H)        Use to test blood sugar 2 times daily or as directed.  Ok to substitute alternative if insurance prefers.   Quantity:  100 strip   Refills:  11       metFORMIN 500 MG 24 hr tablet   Commonly known as:  GLUCOPHAGE-XR   Used for:  Diabetes mellitus, type 2 (H)        Dose:  1000 mg   Take 2 tablets (1,000 mg) by mouth 2 times daily (with meals)   Quantity:  360 tablet   Refills:  1         These medicines have changed or have updated prescriptions.        Dose/Directions    metoprolol succinate 200 MG 24 hr tablet   Commonly known as:  TOPROL-XL   This may have changed:  additional instructions   Used for:  Hypertension goal BP (blood pressure) < 140/90        Dose:  200 mg   Take 1 tablet (200 mg) by mouth daily   Quantity:  90 tablet   Refills:  3            Where to get your medicines      These medications were sent to [facility] [place] - [address]  [address] [place] [address]    Hours:  [facility] [place] Phone:  [phone]     blood glucose monitoring lancets    blood glucose monitoring test strip    metFORMIN 500 MG 24 hr tablet                Primary Care Provider Office Phone # Fax #    Ric Umanzor [phone] [address]  [facility] [address]        Equal Access to Services     [facility] Center[facility]HEKHAR : Hadii aad ku hadasho Soomaali, waaxda luqadaha, qaybta kaalmada adeegyada, waxay idiin hayaan adeeg kharash la'aan ah. So [facility] [phone].    ATENCIÓN: Si habla español, tiene a medellin disposición servicios gratuitos de asistencia lingüística. Llame al [phone].    We comply with applicable federal civil rights laws and [place] laws. We do not discriminate on the basis of race, color, national origin, age, disability, sex, sexual orientation, or gender identity.            Thank you!     Thank you for choosing [facility]  for your care. Our goal is always to provide you with excellent care. Hearing back from our patients is one way we can continue to improve our services. Please take a few minutes to complete the written survey that you may receive in the mail after your visit with us. Thank you!             Your Updated Medication List - Protect others around you: Learn how to safely use, store and throw away your medicines at www.disposemymeds.org.          This list is accurate as of 2/14/18  2:03 PM.  Always use your most recent med list.                   Brand Name Dispense Instructions for use Diagnosis    ASPIRIN PO      Take 650 mg by mouth        blood glucose monitoring lancets     100 each    Use to test blood sugar 4 times daily or as directed.    Diabetes mellitus, type 2 (H)       blood glucose monitoring test strip    SHARATH CONTOUR NEXT    100 strip    Use to test blood sugar 2 times daily or as directed.  Ok to substitute alternative if insurance prefers.     Diabetes mellitus, type 2 (H)       coenzyme Q-10 200 MG Caps           fish oil-omega-3 fatty acids 1000 MG capsule      Take 2 g by mouth daily        metFORMIN 500 MG 24 hr tablet    GLUCOPHAGE-XR    360 tablet    Take 2 tablets (1,000 mg) by mouth 2 times daily (with meals)    Diabetes mellitus, type 2 (H)       metoprolol succinate 200 MG 24 hr tablet    TOPROL-XL    90 tablet    Take 1 tablet (200 mg) by mouth daily    Hypertension goal BP (blood pressure) < 140/90       tamsulosin 0.4 MG capsule    FLOMAX    90 capsule    Take 1 capsule (0.4 mg) by mouth daily    Bladder outlet obstruction       VITAMIN D3 PO      Take 1,000 Units by mouth daily

## 2018-02-14 NOTE — PATIENT INSTRUCTIONS
My Diabetes Care Goals:    Healthy Eating: eat Breakfast , lunch and dinner.    Breakfast:  Cheerios, or toast and eggs, or ham and cheese sandwich.    Lunch:  [place], or soups and some crackers.  Can have spaghetti, soft meats, some fish, shrimp, eggs Hard boiled.    Dinner:  Can have fried eggs, can have fish , can have ham or ribs, small baked potato, green beans, grits are ok,   Snacks:  Cheese is great , small amount of ice cream, eat greek yogurt.  Try the vanilla taste without fruit.      Being Active: walking from your truck to the building    Monitoring: check your BG every morning    Taking Medication: Metformin  mg take 1 tabs with breakfast and 1 tablet with dinner for 1 wk.    Next week take 2 pills with breakfast and 2 pills with dinner    Follow up:  Follow up with Dr. Umanzor in a few months to check your BG.       Bring blood glucose meter and logbook with you to all doctor and follow-up appointments.     [facility]:  For Your Diabetes Education and Nutrition Appointments Call:  [phone]   For Diabetes Education or Nutrition Related Questions:   Phone: [phone]  E-mail: [email or web address]  Fax: [phone]   If you need a medication refill please contact your pharmacy. Please allow 3 business days for your refills to be completed.    Instructions for emailing the Diabetes Educators    If you need to communicate a non-urgent message to a Diabetes Educator via email, please send to [email or web address].    Please follow the following email guidelines:    Subject line: Secure: your clinic name (example: Secure: [facility])  In the email please include: First name, middle initial, last name and date of birth.    We will be in touch with you within one (1) business day.

## 2018-02-14 NOTE — PROGRESS NOTES
Diabetes Self Management Training: Initial Assessment Visit for Newly Diagnosed Patients (Complete AADE Goals Flowsheet)    Angel Hill presents today for education, evaluation of glucose control and modification of medication(s) related to Type 2 diabetes.    He is accompanied by self    Patient's diabetes management related comments/concerns:  and works from sunrise to sunset.  This is a difficult situation with this patient.  He has many food restrictions and not willing to make life style changes.  Spent most of the time letting me know what he will not do, and to discuss his work and many barriers he has to accept the fact that changes do need to be made.      Patient's emotional response to diabetes: expresses readiness to learn and uncertain    Patient would like this visit to be focused around the following diabetes-related behaviors and goals: Diabetes pathophysiology, Assistance with making lifestyle changes, Self-care behavioral goal setting, Healthy Eating, Being Active, Monitoring, Taking Medication, Problem Solving, Reducing Risks and Healthy Coping    ASSESSMENT:  Patient Problem List and Family Medical History reviewed for relevant medical history, current medical status, and diabetes risk factors.  As noted above in concerns with this patient.  He refused any reading material, I assess he may have poor reading skills.  So I did go with visual education and he came out to tell me that what I was showing him did not make any sense to him and did not wish for me to further the education piece.  Cognitive mentation may be a big barrier, but difficult to assess with this appointment. Will reach out to his primary PCP to inform him of the difficulties in educating this patient.  He will NOT change his eating habits, will NOT exercise, will continue to work,  He did accept to take his Metformin.  States he still does not know why he needs to take this, but will take it anyway.  Did not  "promise to check his BG for us/himself. Will continue to work with the patient as long as he is willing.    Current Diabetes Management per Patient:  Taking diabetes medications? No, agreed to start Metformin    *Abbreviated insulin dose documentation key: Insulin Trade Name (woteupodb-bdfma-biipch-bedtime) - i.e. Humalog 5-5-5-0 (Humalog 5 units at breakfast, 5 units at lunch, and 5 units at dinner).    Past Diabetes Education: Newly diagnosed    Patient glucose self monitoring as follows: BG meter taught today.   BG meter: Contour Next EZ meter  BG results: not available, 166 BG in the office     BG values are: unable to assess  Patient's most recent   Lab Results   Component Value Date    A1C 10.8 01/12/2018    is not meeting goal of <8.0    Nutrition:  Patient has an inconsistent intake of carbohydrates, has many food choices that he does not like, has no teeth, difficult to chew.  Has many ideas of good food and not so good foods.    Breakfast - if home cheerios and ground up flax or ham and cheese sandwich or a sausage sundeep and egg and cheese.   Lunch - ham and cheese sandwiches.  If home, does not cook, ham and cheese sandwiches at truck stop or soup, non creamy.     Dinner - salad and cheese, does not like [place] fried chicken .  Has many dislikes and certain likes   Snacks - lays potato chips in a small package    Beverages: water, no milk, on drink pepsi    Cultural/Taoist diet restrictions: No     Biggest Challenge to Healthy Eating: knowing what to eat    Physical Activity:    Type: [place] far away from the building to walk further.      Diabetes Risk Factors:  family history, age over 45 years, overweight/obesity and inactivity    Diabetes Complications:  Not discussed today.    Vitals:  There were no vitals taken for this visit.  Estimated body mass index is 38.53 kg/(m^2) as calculated from the following:    Height as of 2/17/17: 1.956 m (6' 5.01\").    Weight as of 2/13/18: 147.4 kg (325 lb). "   Last 3 BP:   BP Readings from Last 3 Encounters:   02/13/18 154/85   02/06/18 (!) 140/91   01/16/18 150/79       History   Smoking Status     Former Smoker     Quit date: 1/1/1988   Smokeless Tobacco     Former User     Comment: 1980       Labs:  Lab Results   Component Value Date    A1C 10.8 01/12/2018     Lab Results   Component Value Date     01/12/2018     Lab Results   Component Value Date     11/30/2009     HDL Cholesterol   Date Value Ref Range Status   11/30/2009 34 (L) 40 - 110 mg/dL Final   ]  GFR Estimate   Date Value Ref Range Status   01/12/2018 >90 >60 mL/min/1.7m2 Final     Comment:     Non  GFR Calc     GFR Estimate If Black   Date Value Ref Range Status   01/12/2018 >90 >60 mL/min/1.7m2 Final     Comment:      GFR Calc     Lab Results   Component Value Date    CR 0.81 01/12/2018     No results found for: MICROALBUMIN    Socio/Economic Considerations:    Support system: not assessed    Health Beliefs and Attitudes:   Patient Activation Measure Survey Score:  No flowsheet data found.    Stage of Change: CONTEMPLATION (Considering change and yet undecided)      Diabetes knowledge and skills assessment:     Patient is knowledgeable in diabetes management concepts related to: thinks he knows diet    Patient needs further education on the following diabetes management concepts: Healthy Eating, Being Active, Monitoring, Taking Medication, Problem Solving, Reducing Risks and Healthy Coping    Barriers to Learning Assessment: Low Literacy and Cognitive impairment    Based on learning assessment above, most appropriate setting for further diabetes education would be: Individual setting.    INTERVENTION:   Education provided today on:  AADE Self-Care Behaviors:  Healthy Eating: carbohydrate counting, consistency in amount, composition, and timing of food intake, weight reduction, heart healthy diet, eating out, portion control, plate planning method and label  reading  Being Active: relationship to blood glucose and describe appropriate activity program  Monitoring: purpose, proper technique, log and interpret results, individual blood glucose targets and frequency of monitoring  Taking Medication: action of prescribed medication, side effects of prescribed medications and when to take medications  Patient was instructed on Contour Next EZ meter and was able to provide an accurate return demonstration. Patient's blood glucose reading today was  mg/dL.    Opportunities for ongoing education and support in diabetes-self management were discussed.    Pt verbalized understanding of concepts discussed and recommendations provided today.       Education Materials Provided:  Carbohydrate Counting and My Plate Planner    PLAN:  See Patient Instructions for co-developed, patient-stated behavior change goals.  AVS printed and provided to patient today.    FOLLOW-UP:  Follow-up with PCP recommended.  Follow-up as needed.   Chart routed to referring provider.    Ongoing plan for education and support: Follow-up visit with diabetes educator in [facility] and Follow-up with primary care provider    Lacey Delgado RN/[facility] Diabetes Educator    Time Spent: 60 minutes  Encounter Type: Individual    Any diabetes medication dose changes were made via the JON Protocol and Collaborative Practice Agreement with the patient's primary care provider. A copy of this encounter was shared with the provider.

## 2018-02-14 NOTE — LETTER
"    2/14/2018         RE: Angel Hill  PO   [place] [address]        Dear Noé,  Thank you for referring your patient, Angel Hill, to the [facility]. Please see a copy of my visit note below.    Read my assessment of Angel, this was very difficult and I did feel a bit uneasy with him. Very much in my face approach.  \"if I was to take medication for diabetes, then why didn't my DrAbran Start me on it\".  I am hoping he will continue to follow up with you.  Not sure where he is at mentally for education.  He refused any books, and did not wish for the power point visual I gave him. So any info you can help me with , I would appreciate the help.      Diabetes Self Management Training: Initial Assessment Visit for Newly Diagnosed Patients (Complete AADE Goals Flowsheet)    Angel Hill presents today for education, evaluation of glucose control and modification of medication(s) related to Type 2 diabetes.    He is accompanied by self    Patient's diabetes management related comments/concerns:  and works from sunrise to sunset.  This is a difficult situation with this patient.  He has many food restrictions and not willing to make life style changes.  Spent most of the time letting me know what he will not do, and to discuss his work and many barriers he has to accept the fact that changes do need to be made.      Patient's emotional response to diabetes: expresses readiness to learn and uncertain    Patient would like this visit to be focused around the following diabetes-related behaviors and goals: Diabetes pathophysiology, Assistance with making lifestyle changes, Self-care behavioral goal setting, Healthy Eating, Being Active, Monitoring, Taking Medication, Problem Solving, Reducing Risks and Healthy Coping    ASSESSMENT:  Patient Problem List and Family Medical History reviewed for relevant medical history, current medical status, and diabetes risk factors.  As " noted above in concerns with this patient.  He refused any reading material, I assess he may have poor reading skills.  So I did go with visual education and he came out to tell me that what I was showing him did not make any sense to him and did not wish for me to further the education piece.  Cognitive mentation may be a big barrier, but difficult to assess with this appointment. Will reach out to his primary PCP to inform him of the difficulties in educating this patient.  He will NOT change his eating habits, will NOT exercise, will continue to work,  He did accept to take his Metformin.  States he still does not know why he needs to take this, but will take it anyway.  Did not promise to check his BG for us/himself. Will continue to work with the patient as long as he is willing.    Current Diabetes Management per Patient:  Taking diabetes medications? No, agreed to start Metformin    *Abbreviated insulin dose documentation key: Insulin Trade Name (oqhxjqjfg-qdqnr-xlxhhi-bedtime) - i.e. Humalog 5-5-5-0 (Humalog 5 units at breakfast, 5 units at lunch, and 5 units at dinner).    Past Diabetes Education: Newly diagnosed    Patient glucose self monitoring as follows: BG meter taught today.   BG meter: Contour Next EZ meter  BG results: not available, 166 BG in the office     BG values are: unable to assess  Patient's most recent   Lab Results   Component Value Date    A1C 10.8 01/12/2018    is not meeting goal of <8.0    Nutrition:  Patient has an inconsistent intake of carbohydrates, has many food choices that he does not like, has no teeth, difficult to chew.  Has many ideas of good food and not so good foods.    Breakfast - if home cheerios and ground up flax or ham and cheese sandwich or a sausage sundeep and egg and cheese.   Lunch - ham and cheese sandwiches.  If home, does not cook, ham and cheese sandwiches at truck stop or soup, non creamy.     Dinner - salad and cheese, does not like Chinese fried chicken .   "Has many dislikes and certain likes   Snacks - lays potato chips in a small package    Beverages: water, no milk, on drink pepsi    Cultural/Jehovah's witness diet restrictions: No     Biggest Challenge to Healthy Eating: knowing what to eat    Physical Activity:    Type: [place] far away from the building to walk further.      Diabetes Risk Factors:  family history, age over 45 years, overweight/obesity and inactivity    Diabetes Complications:  Not discussed today.    Vitals:  There were no vitals taken for this visit.  Estimated body mass index is 38.53 kg/(m^2) as calculated from the following:    Height as of 2/17/17: 1.956 m (6' 5.01\").    Weight as of 2/13/18: 147.4 kg (325 lb).   Last 3 BP:   BP Readings from Last 3 Encounters:   02/13/18 154/85   02/06/18 (!) 140/91   01/16/18 150/79       History   Smoking Status     Former Smoker     Quit date: 1/1/1988   Smokeless Tobacco     Former User     Comment: 1980       Labs:  Lab Results   Component Value Date    A1C 10.8 01/12/2018     Lab Results   Component Value Date     01/12/2018     Lab Results   Component Value Date     11/30/2009     HDL Cholesterol   Date Value Ref Range Status   11/30/2009 34 (L) 40 - 110 mg/dL Final   ]  GFR Estimate   Date Value Ref Range Status   01/12/2018 >90 >60 mL/min/1.7m2 Final     Comment:     Non  GFR Calc     GFR Estimate If Black   Date Value Ref Range Status   01/12/2018 >90 >60 mL/min/1.7m2 Final     Comment:      GFR Calc     Lab Results   Component Value Date    CR 0.81 01/12/2018     No results found for: MICROALBUMIN    Socio/Economic Considerations:    Support system: not assessed    Health Beliefs and Attitudes:   Patient Activation Measure Survey Score:  No flowsheet data found.    Stage of Change: CONTEMPLATION (Considering change and yet undecided)      Diabetes knowledge and skills assessment:     Patient is knowledgeable in diabetes management concepts related to: thinks he " knows diet    Patient needs further education on the following diabetes management concepts: Healthy Eating, Being Active, Monitoring, Taking Medication, Problem Solving, Reducing Risks and Healthy Coping    Barriers to Learning Assessment: Low Literacy and Cognitive impairment    Based on learning assessment above, most appropriate setting for further diabetes education would be: Individual setting.    INTERVENTION:   Education provided today on:  AADE Self-Care Behaviors:  Healthy Eating: carbohydrate counting, consistency in amount, composition, and timing of food intake, weight reduction, heart healthy diet, eating out, portion control, plate planning method and label reading  Being Active: relationship to blood glucose and describe appropriate activity program  Monitoring: purpose, proper technique, log and interpret results, individual blood glucose targets and frequency of monitoring  Taking Medication: action of prescribed medication, side effects of prescribed medications and when to take medications  Patient was instructed on Contour Next EZ meter and was able to provide an accurate return demonstration. Patient's blood glucose reading today was  mg/dL.    Opportunities for ongoing education and support in diabetes-self management were discussed.    Pt verbalized understanding of concepts discussed and recommendations provided today.       Education Materials Provided:  Carbohydrate Counting and My Plate Planner    PLAN:  See Patient Instructions for co-developed, patient-stated behavior change goals.  AVS printed and provided to patient today.    FOLLOW-UP:  Follow-up with PCP recommended.  Follow-up as needed.   Chart routed to referring provider.    Ongoing plan for education and support: Follow-up visit with diabetes educator in 3 and Follow-up with primary care provider    Lacey Delgado RN/JON Benedict Diabetes Educator    Time Spent: 60 minutes  Encounter Type: Individual    Any diabetes medication  dose changes were made via the CDE Protocol and Collaborative Practice Agreement with the patient's primary care provider. A copy of this encounter was shared with the provider.

## 2018-04-12 ENCOUNTER — TELEPHONE (OUTPATIENT)
Dept: FAMILY MEDICINE | Facility: CLINIC | Age: 68
End: 2018-04-12

## 2018-04-12 DIAGNOSIS — E11.9 DIABETES MELLITUS (H): ICD-10-CM

## 2018-04-12 DIAGNOSIS — I10 HYPERTENSION GOAL BP (BLOOD PRESSURE) < 140/90: Primary | ICD-10-CM

## 2018-04-12 LAB
ANION GAP SERPL CALCULATED.3IONS-SCNC: 6 MMOL/L (ref 3–14)
BUN SERPL-MCNC: 21 MG/DL (ref 7–30)
CALCIUM SERPL-MCNC: 9.3 MG/DL (ref 8.5–10.1)
CHLORIDE SERPL-SCNC: 105 MMOL/L (ref 94–109)
CO2 SERPL-SCNC: 29 MMOL/L (ref 20–32)
CREAT SERPL-MCNC: 1 MG/DL (ref 0.66–1.25)
GFR SERPL CREATININE-BSD FRML MDRD: 75 ML/MIN/1.7M2
GLUCOSE SERPL-MCNC: 97 MG/DL (ref 70–99)
HBA1C MFR BLD: 7.2 % (ref 0–6.4)
POTASSIUM SERPL-SCNC: 4.3 MMOL/L (ref 3.4–5.3)
SODIUM SERPL-SCNC: 140 MMOL/L (ref 133–144)

## 2018-04-12 PROCEDURE — 83036 HEMOGLOBIN GLYCOSYLATED A1C: CPT | Performed by: FAMILY MEDICINE

## 2018-04-12 PROCEDURE — 80048 BASIC METABOLIC PNL TOTAL CA: CPT | Performed by: FAMILY MEDICINE

## 2018-04-12 PROCEDURE — 36415 COLL VENOUS BLD VENIPUNCTURE: CPT | Performed by: FAMILY MEDICINE

## 2018-04-12 NOTE — TELEPHONE ENCOUNTER
Patient called back and was abusive to this , swearing and demanding to have labs drawn today.  Very angry that Dr. Umanzor is out of clinic today.

## 2018-04-12 NOTE — TELEPHONE ENCOUNTER
"Reason for Call: Request for an order or referral:    Order or referral being requested: order for lab draw    Date needed: as soon as possible    Has the patient been seen by the PCP for this problem? 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 He would like those \"done today so I can stop by [facility] and get them drawn tonight\".    Phone number Patient can be reached at:  Home number on file [phone] (home)    Best Time:  any    Can we leave a detailed message on this number?  YES    Call taken on 4/12/2018 at 10:47 AM by Mary Banda    "

## 2018-08-02 DIAGNOSIS — E11.9 TYPE 2 DIABETES MELLITUS WITHOUT COMPLICATION, WITHOUT LONG-TERM CURRENT USE OF INSULIN (H): Primary | ICD-10-CM

## 2018-08-02 RX ORDER — METFORMIN HCL 500 MG
1000 TABLET, EXTENDED RELEASE 24 HR ORAL 2 TIMES DAILY WITH MEALS
Qty: 120 TABLET | Refills: 0 | Status: SHIPPED | OUTPATIENT
Start: 2018-08-02 | End: 2018-08-13

## 2018-08-02 NOTE — TELEPHONE ENCOUNTER
I refilled his medication for 1 month but he needs to have a follow-up visit to address his diabetes before the next refill

## 2018-08-02 NOTE — TELEPHONE ENCOUNTER
Appt scheduled for tomorrow 8/3/18 with CISCO Sparks CNP.  Pt was instructed to come in fasting.    Allie ENCARNACION RN

## 2018-08-02 NOTE — TELEPHONE ENCOUNTER
Patient is calling wondering why he has not heard from us. I told him he just called, and we will call him, when we have an answer for him. I also told him Dr. Umanzor is not here today.  Nikole Thomas  [facility]  [facility]

## 2018-08-02 NOTE — TELEPHONE ENCOUNTER
Reason for Call:  Medication or medication refill:    Do you use a [facility]?  Name of the pharmacy and phone number for the current request:  [facility] [place] [phone]    Name of the medication requested: Metformin - Pt states that [place] an [place]  and cannot get in for an appt and he needs a refill on his Metformin.  I did advise the pt that he needs to be seen in clinic once every 6 mo and he declined to make an appt.  Please call patient and advise.      Last Written Prescription Date:  2/14/18  Last Fill Quantity: 360,  # refills: 1   Last office visit: 1/15/2018 with prescribing provider:  1/12/18   Future Office Visit:   Next 5 appointments (look out 90 days)     Aug 06, 2018  1:00 PM CDT   Return Visit with RACHELE Nino MD   [facility]    [address] [address] [phone]                 Other request:     Can we leave a detailed message on this number? YES    Phone number patient can be reached at: Cell number on file:    Telephone Information:   Mobile [phone]       Best Time: any    Call taken on 8/2/2018 at 1:10 PM by Sonia Arias

## 2018-08-02 NOTE — TELEPHONE ENCOUNTER
Routing refill request to provider for review/approval because:  Patient needs to be seen because:  Last OV with Dr. Umanzor was > 6 months ago  Did follow through with seeing Diabetic Ed 2/14/18  See message below    Allie ENCARNACION RN

## 2018-08-03 ENCOUNTER — OFFICE VISIT (OUTPATIENT)
Dept: FAMILY MEDICINE | Facility: CLINIC | Age: 68
End: 2018-08-03
Payer: COMMERCIAL

## 2018-08-03 VITALS
WEIGHT: 306 LBS | TEMPERATURE: 97.3 F | DIASTOLIC BLOOD PRESSURE: 79 MMHG | OXYGEN SATURATION: 97 % | HEART RATE: 62 BPM | SYSTOLIC BLOOD PRESSURE: 143 MMHG | HEIGHT: 76 IN | BODY MASS INDEX: 37.26 KG/M2 | RESPIRATION RATE: 12 BRPM

## 2018-08-03 DIAGNOSIS — I10 HYPERTENSION GOAL BP (BLOOD PRESSURE) < 140/90: ICD-10-CM

## 2018-08-03 DIAGNOSIS — Z11.59 NEED FOR HEPATITIS C SCREENING TEST: ICD-10-CM

## 2018-08-03 DIAGNOSIS — E11.9 TYPE 2 DIABETES MELLITUS WITHOUT COMPLICATION, WITHOUT LONG-TERM CURRENT USE OF INSULIN (H): Primary | ICD-10-CM

## 2018-08-03 DIAGNOSIS — Z13.6 SCREENING FOR AAA (ABDOMINAL AORTIC ANEURYSM): ICD-10-CM

## 2018-08-03 DIAGNOSIS — Z13.6 CARDIOVASCULAR SCREENING; LDL GOAL LESS THAN 130: ICD-10-CM

## 2018-08-03 LAB
CHOLEST SERPL-MCNC: 136 MG/DL
HBA1C MFR BLD: 6.7 % (ref 0–5.6)
HDLC SERPL-MCNC: 34 MG/DL
LDLC SERPL CALC-MCNC: 87 MG/DL
NONHDLC SERPL-MCNC: 102 MG/DL
TRIGL SERPL-MCNC: 77 MG/DL
TSH SERPL DL<=0.005 MIU/L-ACNC: 0.58 MU/L (ref 0.4–4)

## 2018-08-03 PROCEDURE — 99214 OFFICE O/P EST MOD 30 MIN: CPT | Performed by: NURSE PRACTITIONER

## 2018-08-03 PROCEDURE — 84443 ASSAY THYROID STIM HORMONE: CPT | Performed by: NURSE PRACTITIONER

## 2018-08-03 PROCEDURE — 80061 LIPID PANEL: CPT | Performed by: NURSE PRACTITIONER

## 2018-08-03 PROCEDURE — 83036 HEMOGLOBIN GLYCOSYLATED A1C: CPT | Performed by: NURSE PRACTITIONER

## 2018-08-03 PROCEDURE — 36415 COLL VENOUS BLD VENIPUNCTURE: CPT | Performed by: NURSE PRACTITIONER

## 2018-08-03 PROCEDURE — 99207 C FOOT EXAM  NO CHARGE: CPT | Mod: 25 | Performed by: NURSE PRACTITIONER

## 2018-08-03 RX ORDER — METOPROLOL SUCCINATE 200 MG/1
200 TABLET, EXTENDED RELEASE ORAL DAILY
Qty: 90 TABLET | Refills: 3 | Status: SHIPPED | OUTPATIENT
Start: 2018-08-03 | End: 2019-07-15

## 2018-08-03 ASSESSMENT — PAIN SCALES - GENERAL: PAINLEVEL: NO PAIN (0)

## 2018-08-03 NOTE — LETTER
[facility]  [address]  [facility] [address]  Phone: [phone]      8/6/2018     Angel Hill  [address]   [facility] [address]      Dear Angel:    Thank you for allowing me to participate in your care. Your recent test results were reviewed and listed below.  All labs look good.     Your results are provided below for your review  Results for orders placed or performed in visit on 08/03/18   Hemoglobin A1c   Result Value Ref Range    Hemoglobin A1C 6.7 (H) 0 - 5.6 %   Lipid panel reflex to direct LDL Fasting   Result Value Ref Range    Cholesterol 136 <200 mg/dL    Triglycerides 77 <150 mg/dL    HDL Cholesterol 34 (L) >39 mg/dL    LDL Cholesterol Calculated 87 <100 mg/dL    Non HDL Cholesterol 102 <130 mg/dL   TSH with free T4 reflex   Result Value Ref Range    TSH 0.58 0.40 - 4.00 mU/L                 Thank you for choosing [facility]. As a result, please continue with the treatment plan discussed in the office. Return as discussed or sooner if symptoms worsen or fail to improve.     If you have any further questions or concerns, please do not hesitate to contact us.      Sincerely,        DAGOBERTO Lafleur CNP

## 2018-08-03 NOTE — PATIENT INSTRUCTIONS
Schedule your ultrasound at [facility]    We will call you with the results of your labs     Recommend that you get a medication box

## 2018-08-03 NOTE — MR AVS SNAPSHOT
After Visit Summary   8/3/2018    Angel Hill    MRN: [id number]           Patient Information     Date Of Birth          1950        Visit Information        Provider Department      8/3/2018 10:40 AM Lucero Sparks APRN CNP [facility]        Today's Diagnoses     Type 2 diabetes mellitus without complication, without long-term current use of insulin (H)    -  1    CARDIOVASCULAR SCREENING; LDL GOAL LESS THAN 130        Hypertension goal BP (blood pressure) < 140/90        Need for hepatitis C screening test        Screening for AAA (abdominal aortic aneurysm)          Care Instructions    Schedule your ultrasound at [facility]    We will call you with the results of your labs     Recommend that you get a medication box           Follow-ups after your visit        Your next 10 appointments already scheduled     Aug 06, 2018  1:00 PM CDT   Return Visit with RACHELE iNno MD   [facility]    [address] [facility] [address]   [phone]              Future tests that were ordered for you today     Open Future Orders        Priority Expected Expires Ordered    US Abdomen Complete Routine  8/3/2019 8/3/2018    **Hepatitis C Screen Reflex to RNA FUTURE anytime Add-On 8/3/2018 8/3/2019 8/3/2018            Who to contact     If you have questions or need follow up information about today's clinic visit or your schedule please contact [facility] directly at [phone].  Normal or non-critical lab and imaging results will be communicated to you by MyChart, letter or phone within 4 business days after the clinic has received the results. If you do not hear from us within 7 days, please contact the clinic through MyChart or phone. If you have a critical or abnormal lab result, we will notify you by phone as soon as possible.  Submit refill requests through [facility] or call your pharmacy  "and they will forward the refill request to us. Please allow 3 business days for your refill to be completed.          Additional Information About Your Visit        Care EveryWhere ID     This is your Care EveryWhere ID. This could be used by other organizations to access your [place] medical records  [id number]        Your Vitals Were     Pulse Temperature Respirations Height Pulse Oximetry BMI (Body Mass Index)    62 97.3  F (36.3  C) (Tympanic) 12 6' 4\" (1.93 m) 97% 37.25 kg/m2       Blood Pressure from Last 3 Encounters:   08/03/18 143/79   02/13/18 154/85   02/06/18 (!) 140/91    Weight from Last 3 Encounters:   08/03/18 306 lb (138.8 kg)   02/13/18 (!) 325 lb (147.4 kg)   01/12/18 (!) 326 lb (147.9 kg)              We Performed the Following     Albumin Random Urine Quantitative with Creat Ratio     FOOT EXAM     Hemoglobin A1c     Lipid panel reflex to direct LDL Fasting     TSH with free T4 reflex          Today's Medication Changes          These changes are accurate as of 8/3/18 11:25 AM.  If you have any questions, ask your nurse or doctor.               These medicines have changed or have updated prescriptions.        Dose/Directions    metoprolol succinate 200 MG 24 hr tablet   Commonly known as:  TOPROL-XL   This may have changed:  additional instructions   Used for:  Hypertension goal BP (blood pressure) < 140/90        Dose:  200 mg   Take 1 tablet (200 mg) by mouth daily   Quantity:  90 tablet   Refills:  3            Where to get your medicines      These medications were sent to [facility] - [facility] [place] - [address] [address]    Hours:  [place] [facility] Phone:  [phone]     metoprolol succinate 200 MG 24 hr tablet                Primary Care Provider Office Phone # Fax #    Ric Umanzor [phone] [address] [facility] [address]        Equal Access to Services     IRO " GAAR : Hadii aad aleksandra james Block, waaxda luqadaha, qaybta katobias hernandez, waxvadim desire hayedward augustintovamague garrett . So [facility] [phone].    ATENCIÓN: Si habla español, tiene a medellin disposición servicios gratuitos de asistencia lingüística. Llame al [phone].    We comply with applicable federal civil rights laws and [place] laws. We do not discriminate on the basis of race, color, national origin, age, disability, sex, sexual orientation, or gender identity.            Thank you!     Thank you for choosing [facility]  for your care. Our goal is always to provide you with excellent care. Hearing back from our patients is one way we can continue to improve our services. Please take a few minutes to complete the written survey that you may receive in the mail after your visit with us. Thank you!             Your Updated Medication List - Protect others around you: Learn how to safely use, store and throw away your medicines at www.disposemymeds.org.          This list is accurate as of 8/3/18 11:25 AM.  Always use your most recent med list.                   Brand Name Dispense Instructions for use Diagnosis    ASPIRIN PO      Take 650 mg by mouth        blood glucose monitoring lancets     100 each    Use to test blood sugar 4 times daily or as directed.    Diabetes mellitus, type 2 (H)       blood glucose monitoring test strip    SHARATH CONTOUR NEXT    100 strip    Use to test blood sugar 2 times daily or as directed.  Ok to substitute alternative if insurance prefers.    Diabetes mellitus, type 2 (H)       coenzyme Q-10 200 MG Caps           fish oil-omega-3 fatty acids 1000 MG capsule      Take 2 g by mouth daily        metFORMIN 500 MG 24 hr tablet    GLUCOPHAGE-XR    120 tablet    Take 2 tablets (1,000 mg) by mouth 2 times daily (with meals)    Type 2 diabetes mellitus without complication, without long-term current use of insulin (H)       metoprolol succinate 200 MG 24 hr  tablet    TOPROL-XL    90 tablet    Take 1 tablet (200 mg) by mouth daily    Hypertension goal BP (blood pressure) < 140/90       tamsulosin 0.4 MG capsule    FLOMAX    90 capsule    Take 1 capsule (0.4 mg) by mouth daily    Bladder outlet obstruction       VITAMIN D3 PO      Take 1,000 Units by mouth daily

## 2018-08-03 NOTE — PROGRESS NOTES
"  SUBJECTIVE:   Angel Hill is a 68 year old male who presents to clinic today for the following health issues:      Diabetes Follow-up      Patient is checking blood sugars: not at all    Diabetic concerns: None     Symptoms of hypoglycemia (low blood sugar): none     Paresthesias (numbness or burning in feet) or sores: Yes      Date of last diabetic eye exam: 2018 at [facility]    BP Readings from Last 2 Encounters:   08/03/18 143/79   02/13/18 154/85     Hemoglobin A1C (%)   Date Value   08/03/2018 6.7 (H)   04/12/2018 7.2 (H)     LDL Cholesterol Calculated (mg/dL)   Date Value   08/03/2018 87   11/30/2009 123       Diabetes Management Resources    Amount of exercise or physical activity: 4-5 days/week for an average of 30-45 minutes    Problems taking medications regularly: No having a remebering    Medication side effects: none    Diet: eating what he wants    Hypertension  BP Readings from Last 6 Encounters:   08/03/18 143/79   02/13/18 154/85   02/06/18 (!) 140/91   01/16/18 150/79   01/12/18 155/82   02/23/17 115/46       Health maintenance  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 Doesn't want to get a medication box.  Tells me he \"will do what he can\" to try and remember to take his medication.  -Has not had a screening abdominal ultrasound for AAA  -Has not had hepatitis C screening        Problem list and histories reviewed & adjusted, as indicated.  Additional history: as documented    Patient Active Problem List   Diagnosis     Melanoma of skin (H)     Urethritis     PROSTATE CARCINOMA     Obesity     CARDIOVASCULAR SCREENING; LDL GOAL LESS THAN 130     Advanced directives, counseling/discussion     Hypertension goal BP (blood pressure) < 140/90     Osteoarthritis of left hip, unspecified osteoarthritis type     Type 2 diabetes mellitus without complication, without long-term current use of insulin (H)     Past Surgical History:   Procedure Laterality Date     ABLATE VEIN " VARICOSE RADIO FREQUENCY WITHOUT PHLEBECTOMY MULTIPLE STAB  9/20/2012    Procedure: ABLATE VEIN VARICOSE RADIO FREQUENCY WITHOUT PHLEBECTOMY MULTIPLE STAB;  Right Leg VNUS Ablation, Right ankle ulcer debridement;  Surgeon: Jordan Lopez MD;  Location: [facility]     COLONOSCOPY  9/19/2002     COLONOSCOPY N/A 2/23/2017    Procedure: COLONOSCOPY;  Surgeon: Georges Kiser MD;  Location: [facility]     ESOPHAGOSCOPY, GASTROSCOPY, DUODENOSCOPY (EGD), COMBINED N/A 2/23/2017    Procedure: COMBINED ESOPHAGOSCOPY, GASTROSCOPY, DUODENOSCOPY (EGD), BIOPSY SINGLE OR MULTIPLE;  Surgeon: Georges Kiser MD;  Location: [facility]     SURGICAL HISTORY OF -       cyst removed from face     SURGICAL HISTORY OF -       imapacted wisdom teeth     SURGICAL HISTORY OF -   1997    hammer toe     SURGICAL HISTORY OF -   7/01    metastalic melanoma     SURGICAL HISTORY OF -   9/01    left axillary node removal     SURGICAL HISTORY OF -   4/08    prostate cryoablation       Social History   Substance Use Topics     Smoking status: Former Smoker     Quit date: 1/1/1988     Smokeless tobacco: Former User      Comment: 1980     Alcohol use Yes      Comment: 2 beers once a week     Family History   Problem Relation Age of Onset     Musculoskeletal Disorder Mother      Cancer Father      Hypertension Maternal Grandmother      HEART DISEASE Maternal Grandfather      HEART DISEASE Paternal Grandmother      HEART DISEASE Paternal Grandfather      Musculoskeletal Disorder Sister      foot problems     Unknown/Adopted Sister      Unknown/Adopted Brother            Reviewed and updated as needed this visit by clinical staff  Tobacco  Allergies  Meds  Problems  Med Hx  Surg Hx  Fam Hx  Soc Hx        Reviewed and updated as needed this visit by Provider  Allergies  Meds  Problems         ROS:  Constitutional, HEENT, cardiovascular, pulmonary, gi and gu systems are negative, except as otherwise noted.    OBJECTIVE:     /79 (BP Location: Right  "arm, Patient Position: Chair, Cuff Size: Adult Large)  Pulse 62  Temp 97.3  F (36.3  C) (Tympanic)  Resp 12  Ht 6' 4\" (1.93 m)  Wt 306 lb (138.8 kg)  SpO2 97%  BMI 37.25 kg/m2  Body mass index is 37.25 kg/(m^2).  GENERAL: healthy, alert and no distress  EYES: Eyes grossly normal to inspection and conjunctivae and sclerae normal  NECK: no adenopathy, no asymmetry, masses, or scars and thyroid normal to palpation  RESP: lungs clear to auscultation - no rales, rhonchi or wheezes  CV: regular rate and rhythm, normal S1 S2, no S3 or S4, no murmur, click or rub, no peripheral edema and peripheral pulses strong  MS: no gross musculoskeletal defects noted, no edema  SKIN: no suspicious lesions or rashes  PSYCH: mentation appears normal, appearance disheveled and mildly irritated throughout visit.  Diabetic foot exam: normal DP and PT pulses, no trophic changes or ulcerative lesions and normal sensory exam    Diagnostic Test Results:  Results for orders placed or performed in visit on 08/03/18   Hemoglobin A1c   Result Value Ref Range    Hemoglobin A1C 6.7 (H) 0 - 5.6 %   Lipid panel reflex to direct LDL Fasting   Result Value Ref Range    Cholesterol 136 <200 mg/dL    Triglycerides 77 <150 mg/dL    HDL Cholesterol 34 (L) >39 mg/dL    LDL Cholesterol Calculated 87 <100 mg/dL    Non HDL Cholesterol 102 <130 mg/dL   TSH with free T4 reflex   Result Value Ref Range    TSH 0.58 0.40 - 4.00 mU/L       ASSESSMENT/PLAN:     ASSESSMENT:  1. Type 2 diabetes mellitus without complication, without long-term current use of insulin (H).  Fairly good control.  No change in plan of care.   2. CARDIOVASCULAR SCREENING; LDL GOAL LESS THAN 130.  Lipid panel acceptable.  No change in plan of care.   3. Hypertension goal BP (blood pressure) < 140/90 blood pressure at upper end of desired range.  Patient does not want to make any changes.   4. Need for hepatitis C screening test.  Will check labs today.   5. Screening for AAA (abdominal " aortic aneurysm).  Patient to schedule ultrasound.       PLAN:  Orders Placed This Encounter     FOOT EXAM     US Abdomen Complete     Hemoglobin A1c     Lipid panel reflex to direct LDL Fasting     TSH with free T4 reflex     **Hepatitis C Screen Reflex to RNA FUTURE anytime     Albumin Random Urine Quantitative with Creat Ratio     metoprolol succinate (TOPROL-XL) 200 MG 24 hr tablet       Patient Instructions   Schedule your ultrasound at [facility]    We will call you with the results of your labs     Recommend that you get a medication box   Patient agrees with plan of care as outlined. Call or return to the clinic with any worsening of symptoms or no resolution. Medication side effects reviewed.  Chart documentation with Dragon Voice recognition Software. Although reviewed after completion, some words and grammatical errors may remain.      Lucero Sparks, SHAYLA, APRN CNP  [facility]

## 2018-08-13 DIAGNOSIS — E11.9 TYPE 2 DIABETES MELLITUS WITHOUT COMPLICATION, WITHOUT LONG-TERM CURRENT USE OF INSULIN (H): ICD-10-CM

## 2018-08-13 NOTE — TELEPHONE ENCOUNTER
Patient called stating he had an appt on 08/3 and his metformin was only approved for a month. Patient is requesting refill for 90days  With refills.    No need to call patient back, unless there are questions or problems.      [place]arcia BOWER  [facility]

## 2018-08-13 NOTE — TELEPHONE ENCOUNTER
Routing refill request to provider for review/approval because:  Labs out of range:  A1c 6.7    Is it ok to send in 6 month supply for metformin?    Thank you  Angelika LAINEZ RN

## 2018-08-14 RX ORDER — METFORMIN HCL 500 MG
1000 TABLET, EXTENDED RELEASE 24 HR ORAL 2 TIMES DAILY WITH MEALS
Qty: 360 TABLET | Refills: 3 | Status: SHIPPED | OUTPATIENT
Start: 2018-08-14 | End: 2019-07-15

## 2018-08-14 NOTE — TELEPHONE ENCOUNTER
Patient needs to come into clinic and provide urine sample of for microalbuminuria and CRN for blood pressure check

## 2018-09-07 ENCOUNTER — ALLIED HEALTH/NURSE VISIT (OUTPATIENT)
Dept: FAMILY MEDICINE | Facility: CLINIC | Age: 68
End: 2018-09-07
Payer: COMMERCIAL

## 2018-09-07 VITALS — DIASTOLIC BLOOD PRESSURE: 68 MMHG | SYSTOLIC BLOOD PRESSURE: 138 MMHG

## 2018-09-07 DIAGNOSIS — Z11.59 NEED FOR HEPATITIS C SCREENING TEST: ICD-10-CM

## 2018-09-07 DIAGNOSIS — I10 HYPERTENSION GOAL BP (BLOOD PRESSURE) < 140/90: Primary | ICD-10-CM

## 2018-09-07 DIAGNOSIS — E11.9 TYPE 2 DIABETES MELLITUS WITHOUT COMPLICATION, WITHOUT LONG-TERM CURRENT USE OF INSULIN (H): ICD-10-CM

## 2018-09-07 LAB
CREAT UR-MCNC: 223 MG/DL
MICROALBUMIN UR-MCNC: 34 MG/L
MICROALBUMIN/CREAT UR: 15.38 MG/G CR (ref 0–17)

## 2018-09-07 PROCEDURE — 86803 HEPATITIS C AB TEST: CPT | Performed by: NURSE PRACTITIONER

## 2018-09-07 PROCEDURE — 82043 UR ALBUMIN QUANTITATIVE: CPT | Performed by: NURSE PRACTITIONER

## 2018-09-07 PROCEDURE — 99207 ZZC NO CHARGE NURSE ONLY: CPT

## 2018-09-07 PROCEDURE — 36415 COLL VENOUS BLD VENIPUNCTURE: CPT | Performed by: NURSE PRACTITIONER

## 2018-09-10 LAB — HCV AB SERPL QL IA: NONREACTIVE

## 2018-10-03 ENCOUNTER — TELEPHONE (OUTPATIENT)
Dept: FAMILY MEDICINE | Facility: CLINIC | Age: 68
End: 2018-10-03

## 2018-10-26 ENCOUNTER — OFFICE VISIT (OUTPATIENT)
Dept: FAMILY MEDICINE | Facility: CLINIC | Age: 68
End: 2018-10-26
Payer: COMMERCIAL

## 2018-10-26 VITALS
TEMPERATURE: 98.3 F | SYSTOLIC BLOOD PRESSURE: 135 MMHG | BODY MASS INDEX: 37.26 KG/M2 | RESPIRATION RATE: 18 BRPM | DIASTOLIC BLOOD PRESSURE: 82 MMHG | WEIGHT: 306 LBS | HEART RATE: 68 BPM | HEIGHT: 76 IN | OXYGEN SATURATION: 96 %

## 2018-10-26 DIAGNOSIS — I10 HYPERTENSION GOAL BP (BLOOD PRESSURE) < 140/90: ICD-10-CM

## 2018-10-26 DIAGNOSIS — Z13.6 CARDIOVASCULAR SCREENING; LDL GOAL LESS THAN 130: ICD-10-CM

## 2018-10-26 DIAGNOSIS — E11.9 TYPE 2 DIABETES MELLITUS WITHOUT COMPLICATION, WITHOUT LONG-TERM CURRENT USE OF INSULIN (H): Primary | ICD-10-CM

## 2018-10-26 PROCEDURE — 99214 OFFICE O/P EST MOD 30 MIN: CPT | Performed by: FAMILY MEDICINE

## 2018-10-26 RX ORDER — ATORVASTATIN CALCIUM 40 MG/1
40 TABLET, FILM COATED ORAL DAILY
Qty: 90 TABLET | Refills: 3 | Status: SHIPPED | OUTPATIENT
Start: 2018-10-26 | End: 2019-08-19

## 2018-10-26 NOTE — MR AVS SNAPSHOT
"              After Visit Summary   10/26/2018    Angel Hill    MRN: [id number]           Patient Information     Date Of Birth          1950        Visit Information        Provider Department      10/26/2018 9:40 AM Ric Umanzor MD [facility]        Today's Diagnoses     Type 2 diabetes mellitus without complication, without long-term current use of insulin (H)    -  1    Hypertension goal BP (blood pressure) < 140/90        CARDIOVASCULAR SCREENING; LDL GOAL LESS THAN 130           Follow-ups after your visit        Who to contact     If you have questions or need follow up information about today's clinic visit or your schedule please contact [facility] directly at [phone].  Normal or non-critical lab and imaging results will be communicated to you by MyChart, letter or phone within 4 business days after the clinic has received the results. If you do not hear from us within 7 days, please contact the clinic through MyChart or phone. If you have a critical or abnormal lab result, we will notify you by phone as soon as possible.  Submit refill requests through [employer] or call your pharmacy and they will forward the refill request to us. Please allow 3 business days for your refill to be completed.          Additional Information About Your Visit        Care EveryWhere ID     This is your Care EveryWhere ID. This could be used by other organizations to access your [place] medical records  [id number]        Your Vitals Were     Pulse Temperature Respirations Height Pulse Oximetry BMI (Body Mass Index)    68 98.3  F (36.8  C) 18 6' 4\" (1.93 m) 96% 37.25 kg/m2       Blood Pressure from Last 3 Encounters:   10/26/18 135/82   09/07/18 138/68   08/03/18 143/79    Weight from Last 3 Encounters:   10/26/18 306 lb (138.8 kg)   08/03/18 306 lb (138.8 kg)   02/13/18 (!) 325 lb (147.4 kg)              Today, you had the following     No orders found for display       "   Today's Medication Changes          These changes are accurate as of 10/26/18 11:20 AM.  If you have any questions, ask your nurse or doctor.               Start taking these medicines.        Dose/Directions    atorvastatin 40 MG tablet   Commonly known as:  LIPITOR   Used for:  CARDIOVASCULAR SCREENING; LDL GOAL LESS THAN 130   Started by:  Ric Umanzor MD        Dose:  40 mg   Take 1 tablet (40 mg) by mouth daily   Quantity:  90 tablet   Refills:  3            Where to get your medicines      These medications were sent to [facility] - - [place] - [id number] [address]    Hours:  [facility] Phone:  [phone]     atorvastatin 40 MG tablet                Primary Care Provider Office Phone # Fax #    Ric Umanzor [phone] [address] [address] [facility] [address]        Equal Access to Services     [facility] AH: Hadii aad ku hadasho Soomaali, waaxda luqadaha, qaybta kaalmada adeegyada, waxay idiin hayaan adechristy gan . So [facility] [phone].    ATENCIÓN: Si habla español, tiene a medellin disposición servicios gratuitos de asistencia lingüística. Ll[facility] [phone].    We comply with applicable federal civil rights laws and [place] laws. We do not discriminate on the basis of race, color, national origin, age, disability, sex, sexual orientation, or gender identity.            Thank you!     Thank you for choosing [facility]  for your care. Our goal is always to provide you with excellent care. Hearing back from our patients is one way we can continue to improve our services. Please take a few minutes to complete the written survey that you may receive in the mail after your visit with us. Thank you!             Your Updated Medication List - Protect others around you: Learn how to safely use, store and throw away your medicines at www.disposemymeds.org.          This  list is accurate as of 10/26/18 11:20 AM.  Always use your most recent med list.                   Brand Name Dispense Instructions for use Diagnosis    ASPIRIN PO      Take 650 mg by mouth        atorvastatin 40 MG tablet    LIPITOR    90 tablet    Take 1 tablet (40 mg) by mouth daily    CARDIOVASCULAR SCREENING; LDL GOAL LESS THAN 130       blood glucose monitoring lancets     100 each    Use to test blood sugar 4 times daily or as directed.    Diabetes mellitus, type 2 (H)       blood glucose monitoring test strip    SHARATH CONTOUR NEXT    100 strip    Use to test blood sugar 2 times daily or as directed.  Ok to substitute alternative if insurance prefers.    Diabetes mellitus, type 2 (H)       coenzyme Q-10 200 MG Caps           fish oil-omega-3 fatty acids 1000 MG capsule      Take 2 g by mouth daily        metFORMIN 500 MG 24 hr tablet    GLUCOPHAGE-XR    360 tablet    Take 2 tablets (1,000 mg) by mouth 2 times daily (with meals)    Type 2 diabetes mellitus without complication, without long-term current use of insulin (H)       metoprolol succinate 200 MG 24 hr tablet    TOPROL-XL    90 tablet    Take 1 tablet (200 mg) by mouth daily    Hypertension goal BP (blood pressure) < 140/90       tamsulosin 0.4 MG capsule    FLOMAX    90 capsule    Take 1 capsule (0.4 mg) by mouth daily    Bladder outlet obstruction       VITAMIN D3 PO      Take 1,000 Units by mouth daily

## 2018-10-26 NOTE — PROGRESS NOTES
"  SUBJECTIVE:   Angel Hill is a 68 year old male who presents to clinic today for the following health issues:      Diabetes Follow-up      Patient is checking blood sugars: not at all    Diabetic concerns: None     Symptoms of hypoglycemia (low blood sugar): none     Paresthesias (numbness or burning in feet) or sores: Yes      Date of last diabetic eye exam: none    Diabetes Management Resources    Hyperlipidemia Follow-Up      Rate your low fat/cholesterol diet?: good    Taking statin?  No    Other lipid medications/supplements?:  Fish oil/Omega 3, dose 1000 without side effects    Hypertension Follow-up      Outpatient blood pressures are not being checked.    Low Salt Diet: no added salt    BP Readings from Last 2 Encounters:   09/07/18 138/68   08/03/18 143/79     Hemoglobin A1C (%)   Date Value   08/03/2018 6.7 (H)   04/12/2018 7.2 (H)     LDL Cholesterol Calculated (mg/dL)   Date Value   08/03/2018 87   11/30/2009 123       Amount of exercise or physical activity: None    Problems taking medications regularly: No    Medication side effects: none    Diet: low salt            Problem list and histories reviewed & adjusted, as indicated.  Additional history:         Reviewed and updated as needed this visit by clinical staff  Tobacco  Allergies  Meds       Reviewed and updated as needed this visit by Provider      OBJECTIVE:  /82  Pulse 68  Temp 98.3  F (36.8  C)  Resp 18  Ht 6' 4\" (1.93 m)  Wt 306 lb (138.8 kg)  SpO2 96%  BMI 37.25 kg/m2  LUNGS: clear to auscultation, normal breath sounds  CV: RRR without murmur  ABD: BS+, soft, nontender, no masses, no hepatosplenomegaly  EXTREMITIES: without joint tenderness, swelling or erythema.  No muscle tenderness or abnormality.  SKIN: No rashes or abnormalities  NEURO:non focal exam    ASSESSMENT:     Type 2 diabetes mellitus without complication, without long-term current use of insulin (H)  Hypertension goal BP (blood pressure) < " 140/90  CARDIOVASCULAR SCREENING; LDL GOAL LESS THAN 130    PLAN:  Start Lipitor  Recommend regular blood glucose monitoring  Recheck in 3 months.    Orders Placed This Encounter     atorvastatin (LIPITOR) 40 MG tablet

## 2018-12-05 ENCOUNTER — TELEPHONE (OUTPATIENT)
Dept: FAMILY MEDICINE | Facility: CLINIC | Age: 68
End: 2018-12-05

## 2018-12-05 ENCOUNTER — OFFICE VISIT (OUTPATIENT)
Dept: FAMILY MEDICINE | Facility: CLINIC | Age: 68
End: 2018-12-05
Payer: COMMERCIAL

## 2018-12-05 ENCOUNTER — HOSPITAL ENCOUNTER (OUTPATIENT)
Dept: ULTRASOUND IMAGING | Facility: CLINIC | Age: 68
Discharge: HOME | End: 2018-12-05
Attending: INTERNAL MEDICINE | Admitting: INTERNAL MEDICINE
Payer: MEDICARE

## 2018-12-05 VITALS
DIASTOLIC BLOOD PRESSURE: 76 MMHG | WEIGHT: 307.2 LBS | BODY MASS INDEX: 37.41 KG/M2 | OXYGEN SATURATION: 99 % | HEART RATE: 64 BPM | RESPIRATION RATE: 14 BRPM | HEIGHT: 76 IN | SYSTOLIC BLOOD PRESSURE: 134 MMHG | TEMPERATURE: 97.3 F

## 2018-12-05 DIAGNOSIS — L03.116 CELLULITIS OF LEFT LOWER EXTREMITY: ICD-10-CM

## 2018-12-05 DIAGNOSIS — R60.0 LEG EDEMA, LEFT: Primary | ICD-10-CM

## 2018-12-05 LAB — D DIMER PPP FEU-MCNC: 0.7 UG/ML FEU (ref 0–0.5)

## 2018-12-05 PROCEDURE — 99214 OFFICE O/P EST MOD 30 MIN: CPT | Performed by: INTERNAL MEDICINE

## 2018-12-05 PROCEDURE — 36415 COLL VENOUS BLD VENIPUNCTURE: CPT | Performed by: INTERNAL MEDICINE

## 2018-12-05 PROCEDURE — 85379 FIBRIN DEGRADATION QUANT: CPT | Performed by: INTERNAL MEDICINE

## 2018-12-05 PROCEDURE — 93971 EXTREMITY STUDY: CPT | Mod: LT

## 2018-12-05 RX ORDER — CEPHALEXIN 500 MG/1
500 CAPSULE ORAL 4 TIMES DAILY
Qty: 28 CAPSULE | Refills: 0 | Status: SHIPPED | OUTPATIENT
Start: 2018-12-05 | End: 2018-12-12

## 2018-12-05 NOTE — PATIENT INSTRUCTIONS
This seems most likely due to cellulitis, but we'll check a blood test to check for a blood clot just to make sure.  If this blood test is high, we will need to have you come back for an ultrasound to check for the clot.  But if the test is normal, then it rules out clot, and the symptoms are due to infection and we'll have you take Keflex antibiotics for a week.  Follow-up as needed if not improving in a week or new/worsening symptoms develop.       Discharge Instructions for Cellulitis  You have been diagnosed with cellulitis. This is an infection in the deepest layer of the skin. In some cases, the infection also affects the muscle. Cellulitis is caused by bacteria. The bacteria can enter the body through broken skin. This can happen with a cut, scratch, animal bite, or an insect bite that has been scratched. You may have been treated in the hospital with antibiotics and fluids. You will likely be given a prescription for antibiotics to take at home. This sheet will help you take care of yourself at home.  Home care  When you are home:    Take the prescribed antibiotic medicine you are given as directed until it is gone. Take it even if you feel better. It treats the infection and stops it from returning. Not taking all the medicine can make future infections hard to treat.    Keep the infected area clean.    When possible, raise the infected area above the level of your heart. This helps keep swelling down.    Talk with your healthcare provider if you are in pain. Ask what kind of over-the-counter medicine you can take for pain.    Apply clean bandages as advised.    Take your temperature once a day for a week.    Wash your hands often to prevent spreading the infection.  In the future, wash your hands before and after you touch cuts, scratches, or bandages. This will help prevent infection.   When to call your healthcare provider  Call your healthcare provider immediately if you have any of the  following:    Difficulty or pain when moving the joints above or below the infected area    Discharge or pus draining from the area    Fever of 100.4 F (38 C) or higher, or as directed by your healthcare provider    Pain that gets worse in or around the infected     Redness that gets worse in or around the infected area, particularly if the area of redness expands to a wider area    Shaking chills    Swelling of the infected area    Vomiting   Date Last Reviewed: 8/1/2016 2000-2018 [employer]. [address] [place] [address]. All rights reserved. This information is not intended as a substitute for professional medical care. Always follow your healthcare professional's instructions.

## 2018-12-05 NOTE — MR AVS SNAPSHOT
After Visit Summary   12/5/2018    Angel Hill    MRN: [id number]           Patient Information     Date Of Birth          1950        Visit Information        Provider Department      12/5/2018 8:40 AM John Cornejo MD [facility]        Today's Diagnoses     Leg edema, left    -  1    Cellulitis of left lower extremity          Care Instructions    This seems most likely due to cellulitis, but we'll check a blood test to check for a blood clot just to make sure.  If this blood test is high, we will need to have you come back for an ultrasound to check for the clot.  But if the test is normal, then it rules out clot, and the symptoms are due to infection and we'll have you take Keflex antibiotics for a week.  Follow-up as needed if not improving in a week or new/worsening symptoms develop.       Discharge Instructions for Cellulitis  You have been diagnosed with cellulitis. This is an infection in the deepest layer of the skin. In some cases, the infection also affects the muscle. Cellulitis is caused by bacteria. The bacteria can enter the body through broken skin. This can happen with a cut, scratch, animal bite, or an insect bite that has been scratched. You may have been treated in the hospital with antibiotics and fluids. You will likely be given a prescription for antibiotics to take at home. This sheet will help you take care of yourself at home.  Home care  When you are home:    Take the prescribed antibiotic medicine you are given as directed until it is gone. Take it even if you feel better. It treats the infection and stops it from returning. Not taking all the medicine can make future infections hard to treat.    Keep the infected area clean.    When possible, raise the infected area above the level of your heart. This helps keep swelling down.    Talk with your healthcare provider if you are in pain. Ask what kind of over-the-counter medicine you can take for  pain.    Apply clean bandages as advised.    Take your temperature once a day for a week.    Wash your hands often to prevent spreading the infection.  In the future, wash your hands before and after you touch cuts, scratches, or bandages. This will help prevent infection.   When to call your healthcare provider  Call your healthcare provider immediately if you have any of the following:    Difficulty or pain when moving the joints above or below the infected area    Discharge or pus draining from the area    Fever of 100.4 F (38 C) or higher, or as directed by your healthcare provider    Pain that gets worse in or around the infected     Redness that gets worse in or around the infected area, particularly if the area of redness expands to a wider area    Shaking chills    Swelling of the infected area    Vomiting   Date Last Reviewed: 8/1/2016 2000-2018 [employer]. [address] [place] [address]. All rights reserved. This information is not intended as a substitute for professional medical care. Always follow your healthcare professional's instructions.                Follow-ups after your visit        Follow-up notes from your care team     Return in about 1 week (around 12/12/2018), or if symptoms worsen or fail to improve.      Who to contact     If you have questions or need follow up information about today's clinic visit or your schedule please contact [facility] directly at [phone].  Normal or non-critical lab and imaging results will be communicated to you by MyChart, letter or phone within 4 business days after the clinic has received the results. If you do not hear from us within 7 days, please contact the clinic through MyChart or phone. If you have a critical or abnormal lab result, we will notify you by phone as soon as possible.  Submit refill requests through [employer] or call your pharmacy and they will forward the refill request to us. Please allow 3  "business days for your refill to be completed.          Additional Information About Your Visit        Care EveryWhere ID     This is your Care EveryWhere ID. This could be used by other organizations to access your [place] medical records  [id number]        Your Vitals Were     Pulse Temperature Respirations Height Pulse Oximetry BMI (Body Mass Index)    64 97.3  F (36.3  C) (Tympanic) 14 6' 4\" (1.93 m) 99% 37.39 kg/m2       Blood Pressure from Last 3 Encounters:   12/05/18 134/76   10/26/18 135/82   09/07/18 138/68    Weight from Last 3 Encounters:   12/05/18 307 lb 3.2 oz (139.3 kg)   10/26/18 306 lb (138.8 kg)   08/03/18 306 lb (138.8 kg)              We Performed the Following     D dimer quantitative          Today's Medication Changes          These changes are accurate as of 12/5/18  9:19 AM.  If you have any questions, ask your nurse or doctor.               Start taking these medicines.        Dose/Directions    cephALEXin 500 MG capsule   Commonly known as:  KEFLEX   Used for:  Cellulitis of left lower extremity   Started by:  John Cornejo MD        Dose:  500 mg   Take 1 capsule (500 mg) by mouth 4 times daily for 7 days   Quantity:  28 capsule   Refills:  0            Where to get your medicines      These medications were sent to [facility] - - [place] - [id number] [address]    Hours:  [facility] Phone:  [phone]     cephALEXin 500 MG capsule                Primary Care Provider Office Phone # Fax #    Ric Umanzor [phone] [address] [facility] [address]        Equal Access to Services     [facility]HE[facility] AH: Hadii monique ramirez Soobey, waaxda luqadaha, qaybta kaalmada adeegyada, jacquelin werner. So [facility] [phone].    ATENCIÓN: Si habla español, tiene a medellin disposición servicios gratuitos de asistencia lingüística. Llame al " [phone].    We comply with applicable federal civil rights laws and [place] laws. We do not discriminate on the basis of race, color, national origin, age, disability, sex, sexual orientation, or gender identity.            Thank you!     Thank you for choosing [facility]  for your care. Our goal is always to provide you with excellent care. Hearing back from our patients is one way we can continue to improve our services. Please take a few minutes to complete the written survey that you may receive in the mail after your visit with us. Thank you!             Your Updated Medication List - Protect others around you: Learn how to safely use, store and throw away your medicines at www.disposemymeds.org.          This list is accurate as of 12/5/18  9:19 AM.  Always use your most recent med list.                   Brand Name Dispense Instructions for use Diagnosis    ASPIRIN PO      Take 650 mg by mouth        atorvastatin 40 MG tablet    LIPITOR    90 tablet    Take 1 tablet (40 mg) by mouth daily    CARDIOVASCULAR SCREENING; LDL GOAL LESS THAN 130       blood glucose monitoring lancets     100 each    Use to test blood sugar 4 times daily or as directed.    Diabetes mellitus, type 2 (H)       blood glucose monitoring test strip    SHARATH CONTOUR NEXT    100 strip    Use to test blood sugar 2 times daily or as directed.  Ok to substitute alternative if insurance prefers.    Diabetes mellitus, type 2 (H)       cephALEXin 500 MG capsule    KEFLEX    28 capsule    Take 1 capsule (500 mg) by mouth 4 times daily for 7 days    Cellulitis of left lower extremity       coenzyme Q-10 200 MG Caps           fish oil-omega-3 fatty acids 1000 MG capsule      Take 2 g by mouth daily        metFORMIN 500 MG 24 hr tablet    GLUCOPHAGE-XR    360 tablet    Take 2 tablets (1,000 mg) by mouth 2 times daily (with meals)    Type 2 diabetes mellitus without complication, without long-term current use of insulin (H)        metoprolol succinate  MG 24 hr tablet    TOPROL-XL    90 tablet    Take 1 tablet (200 mg) by mouth daily    Hypertension goal BP (blood pressure) < 140/90       tamsulosin 0.4 MG capsule    FLOMAX    90 capsule    Take 1 capsule (0.4 mg) by mouth daily    Bladder outlet obstruction       VITAMIN D3 PO      Take 1,000 Units by mouth daily

## 2018-12-05 NOTE — TELEPHONE ENCOUNTER
Pt called back was still in the parking lot, I sent him to diagnostic imaging to get the US done  Tika Mcnally on 12/5/2018 at 10:19 AM

## 2018-12-05 NOTE — PROGRESS NOTES
SUBJECTIVE:   Angel Hill is a 68 year old male who presents to clinic today for the following health issues:    Chief Complaint   Patient presents with     Leg Pain     x 1 week, left leg pain, redness and edema       Leg pain       Duration: x 1 week     Description (location/character/radiation): Patient reports having left anterior lower leg pain, redness, warm to the touch and edema     Intensity:  4/10 most times     Accompanying signs and symptoms: No fevers or chills    History (similar episodes/previous evaluation): Has history of cellulitis in the right leg that was similar.  He is a .      Precipitating or alleviating factors: walking, sitting too long both make it worse    Therapies tried and outcome: none        Problem list and histories reviewed & adjusted, as indicated.  Additional history: as documented    Patient Active Problem List   Diagnosis     Melanoma of skin (H)     Urethritis     PROSTATE CARCINOMA     Obesity     CARDIOVASCULAR SCREENING; LDL GOAL LESS THAN 130     Advanced directives, counseling/discussion     Hypertension goal BP (blood pressure) < 140/90     Osteoarthritis of left hip, unspecified osteoarthritis type     Type 2 diabetes mellitus without complication, without long-term current use of insulin (H)     Past Surgical History:   Procedure Laterality Date     ABLATE VEIN VARICOSE RADIO FREQUENCY WITHOUT PHLEBECTOMY MULTIPLE STAB  9/20/2012    Procedure: ABLATE VEIN VARICOSE RADIO FREQUENCY WITHOUT PHLEBECTOMY MULTIPLE STAB;  Right Leg VNUS Ablation, Right ankle ulcer debridement;  Surgeon: Jordan Lopez MD;  Location: [facility]     COLONOSCOPY  9/19/2002     COLONOSCOPY N/A 2/23/2017    Procedure: COLONOSCOPY;  Surgeon: Georges Kiser MD;  Location: [facility]     ESOPHAGOSCOPY, GASTROSCOPY, DUODENOSCOPY (EGD), COMBINED N/A 2/23/2017    Procedure: COMBINED ESOPHAGOSCOPY, GASTROSCOPY, DUODENOSCOPY (EGD), BIOPSY SINGLE OR MULTIPLE;  Surgeon: Georges Kiser MD;   Location: [facility]     SURGICAL HISTORY OF -       cyst removed from face     SURGICAL HISTORY OF -       imapacted wisdom teeth     SURGICAL HISTORY OF -   1997    hammer toe     SURGICAL HISTORY OF -   7/01    metastalic melanoma     SURGICAL HISTORY OF -   9/01    left axillary node removal     SURGICAL HISTORY OF -   4/08    prostate cryoablation       Social History   Substance Use Topics     Smoking status: Former Smoker     Quit date: 1/1/1988     Smokeless tobacco: Former User      Comment: 1990s     Alcohol use Yes      Comment: 2 beers once a week     Family History   Problem Relation Age of Onset     Musculoskeletal Disorder Mother      Cancer Father      Diabetes Father      Prostate Cancer Father      Colon Cancer Father      Hypertension Maternal Grandmother      Heart Disease Maternal Grandfather      Heart Disease Paternal Grandmother      Heart Disease Paternal Grandfather      Diabetes Paternal Grandfather      Musculoskeletal Disorder Sister      foot problems     Unknown/Adopted Sister      Unknown/Adopted Brother      Diabetes Paternal Half-Sister          Current Outpatient Prescriptions   Medication Sig Dispense Refill     atorvastatin (LIPITOR) 40 MG tablet Take 1 tablet (40 mg) by mouth daily 90 tablet 3     cephALEXin (KEFLEX) 500 MG capsule Take 1 capsule (500 mg) by mouth 4 times daily for 7 days 28 capsule 0     Cholecalciferol (VITAMIN D3 PO) Take 1,000 Units by mouth daily       coenzyme Q-10 200 MG CAPS        fish oil-omega-3 fatty acids (OMEGA 3) 1000 MG capsule Take 2 g by mouth daily       metFORMIN (GLUCOPHAGE-XR) 500 MG 24 hr tablet Take 2 tablets (1,000 mg) by mouth 2 times daily (with meals) 360 tablet 3     metoprolol succinate (TOPROL-XL) 200 MG 24 hr tablet Take 1 tablet (200 mg) by mouth daily 90 tablet 3     tamsulosin (FLOMAX) 0.4 MG capsule Take 1 capsule (0.4 mg) by mouth daily 90 capsule 3     ASPIRIN PO Take 650 mg by mouth       blood glucose monitoring (SHARATH CONTOUR  "NEXT) test strip Use to test blood sugar 2 times daily or as directed.  Ok to substitute alternative if insurance prefers. 100 strip 11     blood glucose monitoring (SHARATH MICROLET) lancets Use to test blood sugar 4 times daily or as directed. 100 each 11     Allergies   Allergen Reactions     Nka [No Known Allergies]        Reviewed and updated as needed this visit by clinical staff  Tobacco  Allergies  Meds  Med Hx  Surg Hx  Fam Hx  Soc Hx      Reviewed and updated as needed this visit by Provider         ROS:  Constitutional, MSK systems are negative, except as otherwise noted.    OBJECTIVE:     /76 (BP Location: Right arm, Patient Position: Chair, Cuff Size: Adult Large)  Pulse 64  Temp 97.3  F (36.3  C) (Tympanic)  Resp 14  Ht 6' 4\" (1.93 m)  Wt 307 lb 3.2 oz (139.3 kg)  SpO2 99%  BMI 37.39 kg/m2  Body mass index is 37.39 kg/(m^2).  GENERAL: healthy, alert and no distress  MS: anterior left lower leg redness, warmth, and pain.  No calf pain.  Diameter of both calves in 43cm.  Pitting edema in both legs            Diagnostic Test Results:  Results for orders placed or performed in visit on 12/05/18 (from the past 24 hour(s))   D dimer quantitative   Result Value Ref Range    D Dimer 0.7 (H) 0.0 - 0.50 ug/ml FEU       ASSESSMENT/PLAN:         1. Leg edema, left  2. Cellulitis of left lower extremity    Angel presents with left leg, swelling, and redness that looked most consistent with cellulitis, but we wanted to do a d-dimer to rule out DVT. This was a bit high, so we proceeded with U/S, which was negative for DVT.  He's had cellulitis of the right leg in the past that responded well to Keflex, so we'll treat again with this for the left leg.  Follow-up as needed if not improving in a week or new/worsening symptoms develop.       - D dimer quantitative  - US Lower Extremity Venous Duplex Left  - cephALEXin (KEFLEX) 500 MG capsule; Take 1 capsule (500 mg) by mouth 4 times daily for 7 days  " Dispense: 28 capsule; Refill: 0      John Cornejo MD  [facility]

## 2019-01-29 ENCOUNTER — OFFICE VISIT (OUTPATIENT)
Dept: FAMILY MEDICINE | Facility: CLINIC | Age: 69
End: 2019-01-29
Payer: COMMERCIAL

## 2019-01-29 VITALS
TEMPERATURE: 96.3 F | RESPIRATION RATE: 14 BRPM | DIASTOLIC BLOOD PRESSURE: 77 MMHG | OXYGEN SATURATION: 93 % | HEART RATE: 71 BPM | SYSTOLIC BLOOD PRESSURE: 152 MMHG | WEIGHT: 315 LBS | BODY MASS INDEX: 38.36 KG/M2 | HEIGHT: 76 IN

## 2019-01-29 DIAGNOSIS — E11.9 TYPE 2 DIABETES MELLITUS WITHOUT COMPLICATION, WITHOUT LONG-TERM CURRENT USE OF INSULIN (H): ICD-10-CM

## 2019-01-29 DIAGNOSIS — I10 HYPERTENSION GOAL BP (BLOOD PRESSURE) < 140/90: ICD-10-CM

## 2019-01-29 DIAGNOSIS — H26.9 CATARACT OF BOTH EYES, UNSPECIFIED CATARACT TYPE: Primary | ICD-10-CM

## 2019-01-29 DIAGNOSIS — Z01.818 PREOP GENERAL PHYSICAL EXAM: ICD-10-CM

## 2019-01-29 LAB
CREAT UR-MCNC: 104 MG/DL
HBA1C MFR BLD: 7.3 % (ref 0–5.6)
MICROALBUMIN UR-MCNC: 16 MG/L
MICROALBUMIN/CREAT UR: 15.67 MG/G CR (ref 0–17)
POTASSIUM SERPL-SCNC: 4.5 MMOL/L (ref 3.4–5.3)

## 2019-01-29 PROCEDURE — 99214 OFFICE O/P EST MOD 30 MIN: CPT | Performed by: NURSE PRACTITIONER

## 2019-01-29 PROCEDURE — 36415 COLL VENOUS BLD VENIPUNCTURE: CPT | Performed by: NURSE PRACTITIONER

## 2019-01-29 PROCEDURE — 83036 HEMOGLOBIN GLYCOSYLATED A1C: CPT | Performed by: NURSE PRACTITIONER

## 2019-01-29 PROCEDURE — 84132 ASSAY OF SERUM POTASSIUM: CPT | Performed by: NURSE PRACTITIONER

## 2019-01-29 PROCEDURE — 82043 UR ALBUMIN QUANTITATIVE: CPT | Performed by: NURSE PRACTITIONER

## 2019-01-29 ASSESSMENT — PAIN SCALES - GENERAL: PAINLEVEL: NO PAIN (0)

## 2019-01-29 ASSESSMENT — MIFFLIN-ST. JEOR: SCORE: 2328.47

## 2019-01-29 NOTE — LETTER
[facility]  [address]  [facility] [address]  Phone: [phone]      1/29/2019     Angel Hill  [address]   [place] [facility] [address]      Dear Angel:    Thank you for allowing me to participate in your care. Your recent test results were reviewed and listed below.      Your results are provided below for your review  Results for orders placed or performed in visit on 01/29/19   Hemoglobin A1c   Result Value Ref Range    Hemoglobin A1C 7.3 (H) 0 - 5.6 %   Potassium   Result Value Ref Range    Potassium 4.5 3.4 - 5.3 mmol/L   Albumin Random Urine Quantitative with Creat Ratio   Result Value Ref Range    Creatinine Urine 104 mg/dL    Albumin Urine mg/L 16 mg/L    Albumin Urine mg/g Cr 15.67 0 - 17 mg/g Cr     Hemoglobin A1c has again increased.  Now at 7.3.  Recommend that you meet annually  with the diabetic educator.            Thank you for choosing [facility]. As a result, please continue with the treatment plan discussed in the office. Return as discussed or sooner if symptoms worsen or fail to improve.     If you have any further questions or concerns, please do not hesitate to contact us.      Sincerely,        DAGOBERTO Lafleur CNP

## 2019-01-29 NOTE — PROGRESS NOTES
[facility]  [address]  [facility] [address]  [phone]  Dept: [phone]    PRE-OP EVALUATION:  Today's date: 2019    Angel Hill (: 1950) presents for pre-operative evaluation assessment as requested by Dr. Riedel.  He requires evaluation and anesthesia risk assessment prior to undergoing surgery/procedure for treatment of both eyes .    Proposed Surgery/ Procedure: cataracts   Date of Surgery/ Procedure: 19 and 19  Time of Surgery/ Procedure: 10:00 AM  Hospital/Surgical Facility: [facility]  Fax number for surgical facility: [phone]  Primary Physician: Ric Umanzor  Type of Anesthesia Anticipated: to be determined    Patient has a Health Care Directive or Living Will:  NO    1. NO - Do you have a history of heart attack, stroke, stent, bypass or surgery on an artery in the head, neck, heart or legs?  2. NO - Do you ever have any pain or discomfort in your chest?  3. NO - Do you have a history of  Heart Failure?  4. NO - Are you troubled by shortness of breath when: walking on the level, up a slight hill or at night?  5. NO - Do you currently have a cold, bronchitis or other respiratory infection?  6. NO - Do you have a cough, shortness of breath or wheezing?  7. NO - Do you sometimes get pains in the calves of your legs when you walk?  8. NO - Do you or anyone in your family have previous history of blood clots?  9. NO - Do you or does anyone in your family have a serious bleeding problem such as prolonged bleeding following surgeries or cuts?  10. NO - Have you ever had problems with anemia or been told to take iron pills?  11. NO - Have you had any abnormal blood loss such as black, tarry or bloody stools, or abnormal vaginal bleeding?  12. NO - Have you ever had a blood transfusion?  13. NO - Have you or any of your relatives ever had problems with anesthesia?  14. NO - Do you have sleep apnea, excessive snoring or daytime  drowsiness?  15. NO - Do you have any prosthetic heart valves?  16. NO - Do you have prosthetic joints?  17. NO - Is there any chance that you may be pregnant?      HPI:     HPI related to upcoming procedure: scheduled to have bilateral cataract extractions      DIABETES - Patient has a longstanding history of DiabetesType Type II . Patient is being treated with oral agents and denies significant side effects. Control has been good. 8/3/18  Hgba1c - 6.7. Complicating factors include but are not limited to: hypertension and morbid obesity .                                                                                                                              .  HYPERTENSION - Patient has longstanding history of HTN , currently denies any symptoms referable to elevated blood pressure. Specifically denies chest pain, palpitations, dyspnea, orthopnea, PND or peripheral edema. Blood pressure readings have been in normal range. Current medication regimen is as listed below. Patient denies any side effects of medication.                                                                                                                                                                                          .    MEDICAL HISTORY:     Patient Active Problem List    Diagnosis Date Noted     Type 2 diabetes mellitus without complication, without long-term current use of insulin (H) 08/02/2018     Priority: Medium     Osteoarthritis of left hip, unspecified osteoarthritis type 09/06/2016     Priority: Medium     Hypertension goal BP (blood pressure) < 140/90 09/24/2013     Priority: Medium     Advanced directives, counseling/discussion 09/14/2012     Priority: Medium     Patient does not have an Advance/Health Care Directive (HCD), declines information/referral.    Nitza Valderrama  September 14, 2012         CARDIOVASCULAR SCREENING; LDL GOAL LESS THAN 130 10/31/2010     Priority: Medium     PROSTATE CARCINOMA 01/14/2008      Priority: Medium     Obesity 01/14/2008     Priority: Medium     Problem list name updated by automated process. Provider to review       Melanoma of skin (H) 12/02/2005     Priority: Medium     melanoma  Problem list name updated by automated process. Provider to review       Urethritis 12/02/2005     Priority: Medium     ulcer 20 + years ago  Problem list name updated by automated process. Provider to review        Past Medical History:   Diagnosis Date     Melanoma of skin, site unspecified     melanoma     Urethritis, unspecified     ulcer 20 + years ago     Past Surgical History:   Procedure Laterality Date     ABLATE VEIN VARICOSE RADIO FREQUENCY WITHOUT PHLEBECTOMY MULTIPLE STAB  9/20/2012    Procedure: ABLATE VEIN VARICOSE RADIO FREQUENCY WITHOUT PHLEBECTOMY MULTIPLE STAB;  Right Leg VNUS Ablation, Right ankle ulcer debridement;  Surgeon: Jordan Lopez MD;  Location: [facility]     COLONOSCOPY  9/19/2002     COLONOSCOPY N/A 2/23/2017    Procedure: COLONOSCOPY;  Surgeon: Georges Kiser MD;  Location: [facility]     ESOPHAGOSCOPY, GASTROSCOPY, DUODENOSCOPY (EGD), COMBINED N/A 2/23/2017    Procedure: COMBINED ESOPHAGOSCOPY, GASTROSCOPY, DUODENOSCOPY (EGD), BIOPSY SINGLE OR MULTIPLE;  Surgeon: Georges Kiser MD;  Location: [facility]     SURGICAL HISTORY OF -       cyst removed from face     SURGICAL HISTORY OF -       imapacted wisdom teeth     SURGICAL HISTORY OF -   1997    hammer toe     SURGICAL HISTORY OF -   7/01    metastalic melanoma     SURGICAL HISTORY OF -   9/01    left axillary node removal     SURGICAL HISTORY OF -   4/08    prostate cryoablation     Current Outpatient Medications   Medication Sig Dispense Refill     ASPIRIN PO Take 650 mg by mouth       atorvastatin (LIPITOR) 40 MG tablet Take 1 tablet (40 mg) by mouth daily 90 tablet 3     blood glucose monitoring (SHARATH CONTOUR NEXT) test strip Use to test blood sugar 2 times daily or as directed.  Ok to substitute alternative if insurance prefers.  "100 strip 11     blood glucose monitoring (SHARATH MICROLET) lancets Use to test blood sugar 4 times daily or as directed. 100 each 11     Cholecalciferol (VITAMIN D3 PO) Take 1,000 Units by mouth daily       coenzyme Q-10 200 MG CAPS        fish oil-omega-3 fatty acids (OMEGA 3) 1000 MG capsule Take 2 g by mouth daily       metFORMIN (GLUCOPHAGE-XR) 500 MG 24 hr tablet Take 2 tablets (1,000 mg) by mouth 2 times daily (with meals) 360 tablet 3     metoprolol succinate (TOPROL-XL) 200 MG 24 hr tablet Take 1 tablet (200 mg) by mouth daily 90 tablet 3     tamsulosin (FLOMAX) 0.4 MG capsule Take 1 capsule (0.4 mg) by mouth daily 90 capsule 3     OTC products: Aspirin    Allergies   Allergen Reactions     Nka [No Known Allergies]       Latex Allergy: NO    Social History     Tobacco Use     Smoking status: Former Smoker     Last attempt to quit: 1988     Years since quittin.1     Smokeless tobacco: Former User     Tobacco comment:    Substance Use Topics     Alcohol use: Yes     Comment: 2 beers once a week     History   Drug Use No       REVIEW OF SYSTEMS:   CONSTITUTIONAL: NEGATIVE for fever, chills, change in weight  EYES: Hx cataracts  ENT/MOUTH: POSITIVE for occasional dysphagia  RESP: occasional wheezing  CV: NEGATIVE for chest pain, palpitations or peripheral edema  MUSCULOSKELETAL: NEGATIVE for significant arthralgias or myalgia  ENDOCRINE: Hx diabetes    EXAM:   /77 (BP Location: Right arm, Patient Position: Chair, Cuff Size: Adult Large)   Pulse 71   Temp 96.3  F (35.7  C) (Tympanic)   Resp 14   Ht 1.93 m (6' 4\")   Wt 144.7 kg (319 lb)   SpO2 93%   BMI 38.83 kg/m    GENERAL APPEARANCE: healthy, alert, no distress and obese  HENT: ear canals and TM's normal and nose and mouth without ulcers or lesions  NECK: no adenopathy and no asymmetry, masses, or scars  RESP: lungs clear to auscultation - no rales, rhonchi or wheezes  CV: regular rate and rhythm, normal S1 S2, no S3 or S4 and no " murmur, click or rub   ABDOMEN: soft, nontender, no HSM or masses and bowel sounds normal  MS: extremities normal- no gross deformities noted  NEURO: Normal strength and tone, sensory exam grossly normal, mentation intact and speech normal  PSYCH: mentation appears normal and affect normal/bright    DIAGNOSTICS:   Serum Potassium- 4.5      Recent Labs   Lab Test 08/03/18  1044 04/12/18  1511 01/12/18  0848  05/08/15  1710   HGB  --   --   --   --  14.9   PLT  --   --   --   --  238   NA  --  140 137  --  139   POTASSIUM  --  4.3 4.7  --  4.9   CR  --  1.00 0.81  --  0.82   A1C 6.7* 7.2* 10.8*   < >  --     < > = values in this interval not displayed.        IMPRESSION:   Reason for surgery/procedure: Cataract extraction  Diagnosis/reason for consult: Preop H&P    The proposed surgical procedure is considered LOW risk.    REVISED CARDIAC RISK INDEX  The patient has the following serious cardiovascular risks for perioperative complications such as (MI, PE, VFib and 3  AV Block):  No serious cardiac risks  INTERPRETATION: 0 risks: Class I (very low risk - 0.4% complication rate)    The patient has the following additional risks for perioperative complications:  No identified additional risks      ICD-10-CM    1. Cataract of both eyes, unspecified cataract type H26.9    2. Preop general physical exam Z01.818 Potassium   3. Type 2 diabetes mellitus without complication, without long-term current use of insulin (H) E11.9 Hemoglobin A1c     Albumin Random Urine Quantitative with Creat Ratio   4. Hypertension goal BP (blood pressure) < 140/90 I10 Potassium       RECOMMENDATIONS:     --Patient is to take all scheduled medications on the day.  He has stopped taking his daily aspirin.    APPROVAL GIVEN to proceed with proposed procedure, without further diagnostic evaluation       Signed Electronically by: Lucero Sparks, NP, APRN CNP    Copy of this evaluation report is provided to requesting physician.    Jak Preop  Guidelines    Revised Cardiac Risk Index

## 2019-01-30 ENCOUNTER — TELEPHONE (OUTPATIENT)
Dept: FAMILY MEDICINE | Facility: CLINIC | Age: 69
End: 2019-01-30

## 2019-01-30 NOTE — TELEPHONE ENCOUNTER
Lucero Sparks, APRN CNP  P Cl Provider Segundo Pool             Please fax to surgery center

## 2019-04-01 ENCOUNTER — TRANSFERRED RECORDS (OUTPATIENT)
Dept: HEALTH INFORMATION MANAGEMENT | Facility: CLINIC | Age: 69
End: 2019-04-01

## 2019-04-01 LAB — RETINOPATHY: NORMAL

## 2019-06-24 ENCOUNTER — HOSPITAL ENCOUNTER (EMERGENCY)
Facility: CLINIC | Age: 69
Discharge: HOME | End: 2019-06-24
Attending: PHYSICIAN ASSISTANT | Admitting: PHYSICIAN ASSISTANT
Payer: COMMERCIAL

## 2019-06-24 ENCOUNTER — APPOINTMENT (OUTPATIENT)
Dept: GENERAL RADIOLOGY | Facility: CLINIC | Age: 69
End: 2019-06-24
Attending: PHYSICIAN ASSISTANT
Payer: COMMERCIAL

## 2019-06-24 VITALS
DIASTOLIC BLOOD PRESSURE: 81 MMHG | OXYGEN SATURATION: 98 % | RESPIRATION RATE: 16 BRPM | TEMPERATURE: 98.2 F | SYSTOLIC BLOOD PRESSURE: 175 MMHG

## 2019-06-24 DIAGNOSIS — S40.022A ARM CONTUSION, LEFT, INITIAL ENCOUNTER: ICD-10-CM
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
## 2019-06-24 PROCEDURE — G0463 HOSPITAL OUTPT CLINIC VISIT: HCPCS | Performed by: PHYSICIAN ASSISTANT

## 2019-06-24 PROCEDURE — 73060 X-RAY EXAM OF HUMERUS: CPT | Mod: LT

## 2019-06-24 PROCEDURE — 99213 OFFICE O/P EST LOW 20 MIN: CPT | Mod: Z6 | Performed by: PHYSICIAN ASSISTANT
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
## 2019-06-24 NOTE — ED AVS SNAPSHOT
[facility] Emergency Department  [address] [address]  Phone:  [phone]  Fax:  [phone]                                    Angel Hill   MRN: [id number]    Department:  [facility] Emergency Department   Date of Visit:  6/24/2019           After Visit Summary Signature Page    I have received my discharge instructions, and my questions have been answered. I have discussed any challenges I see with this plan with the nurse or doctor.    ..........................................................................................................................................  Patient/Patient Representative Signature      ..........................................................................................................................................  Patient Representative Print Name and Relationship to Patient    ..................................................               ................................................  Date                                   Time    ..........................................................................................................................................  Reviewed by Signature/Title    ...................................................              ..............................................  Date                                               Time          22EPIC Rev 08/18
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
## 2019-06-25 ASSESSMENT — ENCOUNTER SYMPTOMS
BLOOD IN STOOL: 0
EYE PAIN: 0
EYE REDNESS: 0
PHOTOPHOBIA: 0
WOUND: 0
WEAKNESS: 0
LIGHT-HEADEDNESS: 0
COUGH: 0
NAUSEA: 0
WHEEZING: 0
EYE DISCHARGE: 0
NECK PAIN: 0
DIARRHEA: 0
COLOR CHANGE: 0
EYE ITCHING: 0
VOMITING: 0
CHILLS: 0
SHORTNESS OF BREATH: 0
FEVER: 0
NECK STIFFNESS: 0
NUMBNESS: 0
FLANK PAIN: 0
HEMATURIA: 0
HEADACHES: 0
DIZZINESS: 0
DYSURIA: 0
ABDOMINAL PAIN: 0
BACK PAIN: 0

## 2019-07-15 DIAGNOSIS — I10 HYPERTENSION GOAL BP (BLOOD PRESSURE) < 140/90: ICD-10-CM

## 2019-07-15 DIAGNOSIS — E11.9 TYPE 2 DIABETES MELLITUS WITHOUT COMPLICATION, WITHOUT LONG-TERM CURRENT USE OF INSULIN (H): ICD-10-CM

## 2019-07-15 RX ORDER — METOPROLOL SUCCINATE 200 MG/1
200 TABLET, EXTENDED RELEASE ORAL DAILY
Qty: 30 TABLET | Refills: 0 | Status: SHIPPED | OUTPATIENT
Start: 2019-07-15 | End: 2019-08-19

## 2019-07-15 RX ORDER — METFORMIN HCL 500 MG
1000 TABLET, EXTENDED RELEASE 24 HR ORAL 2 TIMES DAILY WITH MEALS
Qty: 120 TABLET | Refills: 0 | Status: SHIPPED | OUTPATIENT
Start: 2019-07-15 | End: 2019-08-19

## 2019-07-15 NOTE — TELEPHONE ENCOUNTER
Last Written Prescription Date:  Metformin 8/14/2018  Last Fill Quantity: 360,  # refills: 3   Last office visit: 1/29/2019 with prescribing provider:  CISCO Estrada Office Visit:     Last Written Prescription Date:  Metoprolol 8/3/2018  Last Fill Quantity: 90,  # refills: 3   Last office visit: 1/29/2019 with prescribing provider:  Bridger Estrada Office Visit:      Nikole Thomas  [facility]  [facility]

## 2019-07-15 NOTE — TELEPHONE ENCOUNTER
Routing refill request to provider for review/approval because:  Patient needs to be seen because:  Patient has not had diabetic check since 10/26/18.  Hgb A1c was 8/3/18 at 6.7    Thank you    Angelika LAINEZ RN

## 2019-08-19 ENCOUNTER — OFFICE VISIT (OUTPATIENT)
Dept: FAMILY MEDICINE | Facility: CLINIC | Age: 69
End: 2019-08-19
Payer: COMMERCIAL

## 2019-08-19 VITALS
WEIGHT: 298 LBS | DIASTOLIC BLOOD PRESSURE: 78 MMHG | HEIGHT: 76 IN | RESPIRATION RATE: 16 BRPM | TEMPERATURE: 98 F | SYSTOLIC BLOOD PRESSURE: 131 MMHG | BODY MASS INDEX: 36.29 KG/M2 | OXYGEN SATURATION: 97 % | HEART RATE: 60 BPM

## 2019-08-19 DIAGNOSIS — I10 HYPERTENSION GOAL BP (BLOOD PRESSURE) < 140/90: ICD-10-CM

## 2019-08-19 DIAGNOSIS — C61 MALIGNANT NEOPLASM OF PROSTATE (H): ICD-10-CM

## 2019-08-19 DIAGNOSIS — E66.01 MORBID OBESITY (H): ICD-10-CM

## 2019-08-19 DIAGNOSIS — Z13.6 CARDIOVASCULAR SCREENING; LDL GOAL LESS THAN 130: ICD-10-CM

## 2019-08-19 DIAGNOSIS — N32.0 BLADDER OUTLET OBSTRUCTION: ICD-10-CM

## 2019-08-19 DIAGNOSIS — E11.9 TYPE 2 DIABETES MELLITUS WITHOUT COMPLICATION, WITHOUT LONG-TERM CURRENT USE OF INSULIN (H): Primary | ICD-10-CM

## 2019-08-19 DIAGNOSIS — Z12.5 ENCOUNTER FOR SCREENING FOR MALIGNANT NEOPLASM OF PROSTATE: ICD-10-CM

## 2019-08-19 LAB
ANION GAP SERPL CALCULATED.3IONS-SCNC: 2 MMOL/L (ref 3–14)
BUN SERPL-MCNC: 16 MG/DL (ref 7–30)
CALCIUM SERPL-MCNC: 9.5 MG/DL (ref 8.5–10.1)
CHLORIDE SERPL-SCNC: 104 MMOL/L (ref 94–109)
CHOLEST SERPL-MCNC: 86 MG/DL
CO2 SERPL-SCNC: 30 MMOL/L (ref 20–32)
CREAT SERPL-MCNC: 0.88 MG/DL (ref 0.66–1.25)
GFR SERPL CREATININE-BSD FRML MDRD: 89 ML/MIN/{1.73_M2}
GLUCOSE SERPL-MCNC: 111 MG/DL (ref 70–99)
HBA1C MFR BLD: 6.5 % (ref 0–5.6)
HDLC SERPL-MCNC: 34 MG/DL
LDLC SERPL CALC-MCNC: 36 MG/DL
NONHDLC SERPL-MCNC: 52 MG/DL
POTASSIUM SERPL-SCNC: 4.4 MMOL/L (ref 3.4–5.3)
PSA SERPL-ACNC: 0.57 UG/L (ref 0–4)
SODIUM SERPL-SCNC: 136 MMOL/L (ref 133–144)
TRIGL SERPL-MCNC: 79 MG/DL

## 2019-08-19 PROCEDURE — 84153 ASSAY OF PSA TOTAL: CPT | Performed by: FAMILY MEDICINE

## 2019-08-19 PROCEDURE — 80048 BASIC METABOLIC PNL TOTAL CA: CPT | Performed by: FAMILY MEDICINE

## 2019-08-19 PROCEDURE — 80061 LIPID PANEL: CPT | Performed by: FAMILY MEDICINE

## 2019-08-19 PROCEDURE — G0438 PPPS, INITIAL VISIT: HCPCS | Performed by: FAMILY MEDICINE

## 2019-08-19 PROCEDURE — 83036 HEMOGLOBIN GLYCOSYLATED A1C: CPT | Performed by: FAMILY MEDICINE

## 2019-08-19 PROCEDURE — 36415 COLL VENOUS BLD VENIPUNCTURE: CPT | Performed by: FAMILY MEDICINE

## 2019-08-19 RX ORDER — TAMSULOSIN HYDROCHLORIDE 0.4 MG/1
0.4 CAPSULE ORAL DAILY
Qty: 90 CAPSULE | Refills: 3 | Status: SHIPPED | OUTPATIENT
Start: 2019-08-19 | End: 2021-02-23

## 2019-08-19 RX ORDER — ATORVASTATIN CALCIUM 40 MG/1
40 TABLET, FILM COATED ORAL DAILY
Qty: 90 TABLET | Refills: 3 | Status: SHIPPED | OUTPATIENT
Start: 2019-08-19 | End: 2020-08-11

## 2019-08-19 RX ORDER — METOPROLOL SUCCINATE 200 MG/1
200 TABLET, EXTENDED RELEASE ORAL DAILY
Qty: 90 TABLET | Refills: 3 | Status: SHIPPED | OUTPATIENT
Start: 2019-08-19 | End: 2020-09-11

## 2019-08-19 RX ORDER — METFORMIN HCL 500 MG
1000 TABLET, EXTENDED RELEASE 24 HR ORAL 2 TIMES DAILY WITH MEALS
Qty: 180 TABLET | Refills: 3 | Status: SHIPPED | OUTPATIENT
Start: 2019-08-19 | End: 2020-01-30

## 2019-08-19 ASSESSMENT — ACTIVITIES OF DAILY LIVING (ADL): CURRENT_FUNCTION: NO ASSISTANCE NEEDED

## 2019-08-19 ASSESSMENT — MIFFLIN-ST. JEOR: SCORE: 2228.22

## 2019-08-19 ASSESSMENT — PAIN SCALES - GENERAL: PAINLEVEL: MILD PAIN (2)

## 2019-08-19 NOTE — PROGRESS NOTES
SUBJECTIVE:   Angel Hill is a 69 year old male who presents for Preventive Visit.      Are you in the first 12 months of your Medicare coverage?  No    Healthy Habits:    In general, how would you rate your overall health?  Good    Frequency of exercise:  2-3 days/week    Duration of exercise:  15-30 minutes    Taking medications regularly:  Yes    Barriers to taking medications:  Not applicable    Medication side effects:  Not applicable    Ability to successfully perform activities of daily living:  No assistance needed    Home Safety:  No safety concerns identified    Hearing Impairment:  No hearing concerns    In the past 6 months, have you been bothered by leaking of urine?  No    In general, how would you rate your overall mental or emotional health?  Fair      PHQ-2 Total Score:    Additional concerns today:  No    Do you feel safe in your environment? Yes    Do you have a Health Care Directive? No: Advance care planning was reviewed with patient; patient declined at this time.      Fall risk  Fallen 2 or more times in the past year?: No  Any fall with injury in the past year?: No    Cognitive Screening   1) Repeat 3 items (Leader, Season, Table)    2) Clock draw: NORMAL  3) 3 item recall: Recalls 3 objects  Results: 3 items recalled: COGNITIVE IMPAIRMENT LESS LIKELY    Mini-CogTM Copyright S Efrem. Licensed by the author for use in [employer]; reprinted with permission (soclarissa@.[email or web address]). All rights reserved.      Do you have sleep apnea, excessive snoring or daytime drowsiness?: no    Reviewed and updated as needed this visit by clinical staff         Reviewed and updated as needed this visit by Provider        Social History     Tobacco Use     Smoking status: Former Smoker     Last attempt to quit: 1988     Years since quittin.6     Smokeless tobacco: Former User     Tobacco comment:    Substance Use Topics     Alcohol use: Yes     Comment: 2 beers once a week     If you  "drink alcohol do you typically have >3 drinks per day or >7 drinks per week? No    Alcohol Use 8/7/2012   Prescreen: >3 drinks/day or >7 drinks/week? The patient does not drink >3 drinks per day nor >7 drinks per week.               Current providers sharing in care for this patient include:   Patient Care Team:  Ric Umanzor MD as PCP - General  Ric Umanzor MD as Assigned PCP    The following health maintenance items are reviewed in Epic and correct as of today:  Health Maintenance   Topic Date Due     EYE EXAM  1950     ZOSTER IMMUNIZATION (1 of 2) 07/28/2002     MEDICARE ANNUAL WELLNESS VISIT  07/28/2017     FALL RISK ASSESSMENT  07/28/2017     AORTIC ANEURYSM SCREENING (SYSTEM ASSIGNED)  07/28/2017     ADVANCE CARE PLANNING  09/14/2017     BMP  04/12/2019     A1C  07/29/2019     LIPID  08/03/2019     DIABETIC FOOT EXAM  08/03/2019     INFLUENZA VACCINE (1) 09/01/2019     MICROALBUMIN  01/29/2020     TSH W/FREE T4 REFLEX  08/03/2020     DTAP/TDAP/TD IMMUNIZATION (2 - Td) 02/06/2022     COLONOSCOPY  02/23/2022     HEPATITIS C SCREENING  Completed     PHQ-2  Completed     IPV IMMUNIZATION  Aged Out     MENINGITIS IMMUNIZATION  Aged Out     Labs reviewed in EPIC  Pneumonia Vaccine: Declines    Review of Systems  Constitutional, HEENT, cardiovascular, pulmonary, GI, , musculoskeletal, neuro, skin, endocrine and psych systems are negative, except as otherwise noted.    OBJECTIVE:   There were no vitals taken for this visit. Estimated body mass index is 38.83 kg/m  as calculated from the following:    Height as of 1/29/19: 1.93 m (6' 4\").    Weight as of 1/29/19: 144.7 kg (319 lb).  Physical Exam  GENERAL: healthy, alert and no distress  EYES: Eyes grossly normal to inspection, PERRL and conjunctivae and sclerae normal  HENT: ear canals and TM's normal, nose and mouth without ulcers or lesions  NECK: no adenopathy, no asymmetry, masses, or scars and thyroid normal to palpation  RESP: lungs clear to " "auscultation - no rales, rhonchi or wheezes  CV: regular rate and rhythm, normal S1 S2, no S3 or S4, no murmur, click or rub, no peripheral edema and peripheral pulses strong  ABDOMEN: soft, nontender, no hepatosplenomegaly, no masses and bowel sounds normal  MS: no gross musculoskeletal defects noted, no edema  SKIN: no suspicious lesions or rashes  NEURO: Normal strength and tone, mentation intact and speech normal  PSYCH: mentation appears normal, affect normal/bright    Diagnostic Test Results:  Labs reviewed in Epic    ASSESSMENT / PLAN:   Angel was seen today for annual visit.    Diagnoses and all orders for this visit:    Type 2 diabetes mellitus without complication, without long-term current use of insulin (H)  -     HEMOGLOBIN A1C  -     Lipid panel reflex to direct LDL Fasting  -     metFORMIN (GLUCOPHAGE-XR) 500 MG 24 hr tablet; Take 2 tablets (1,000 mg) by mouth 2 times daily (with meals)    Morbid obesity (H)    Hypertension goal BP (blood pressure) < 140/90  -     BASIC METABOLIC PANEL  -     metoprolol succinate ER (TOPROL-XL) 200 MG 24 hr tablet; Take 1 tablet (200 mg) by mouth daily    Bladder outlet obstruction  -     tamsulosin (FLOMAX) 0.4 MG capsule; Take 1 capsule (0.4 mg) by mouth daily    CARDIOVASCULAR SCREENING; LDL GOAL LESS THAN 130  -     atorvastatin (LIPITOR) 40 MG tablet; Take 1 tablet (40 mg) by mouth daily    PROSTATE CARCINOMA  -     PSA, screen    Encounter for screening for malignant neoplasm of prostate   -     PSA, screen        End of Life Planning:  Patient currently has an advanced directive:     COUNSELING:  Reviewed preventive health counseling, as reflected in patient instructions       Regular exercise       Healthy diet/nutrition    Estimated body mass index is 38.83 kg/m  as calculated from the following:    Height as of 1/29/19: 1.93 m (6' 4\").    Weight as of 1/29/19: 144.7 kg (319 lb).    Weight management plan: Discussed healthy diet and exercise guidelines     " reports that he quit smoking about 31 years ago. He has quit using smokeless tobacco.      Appropriate preventive services were discussed with this patient, including applicable screening as appropriate for cardiovascular disease, diabetes, osteopenia/osteoporosis, and glaucoma.  As appropriate for age/gender, discussed screening for colorectal cancer, prostate cancer, breast cancer, and cervical cancer. Checklist reviewing preventive services available has been given to the patient.    Reviewed patients plan of care and provided an AVS. The Intermediate Care Plan ( asthma action plan, low back pain action plan, and migraine action plan) for Angel meets the Care Plan requirement. This Care Plan has been established and reviewed with the Patient.    Counseling Resources:  ATP IV Guidelines  Pooled Cohorts Equation Calculator  Breast Cancer Risk Calculator  FRAX Risk Assessment  ICSI Preventive Guidelines  Dietary Guidelines for Americans, 2010  USDA's MyPlate  ASA Prophylaxis  Lung CA Screening    Ric Umanzor MD  [facility]    Identified Health Risks:

## 2019-08-19 NOTE — LETTER
[facility]  [address]  [place] [address]  Phone: [phone]      8/19/2019     Angel Hill  [address]   [place] [address]      Dear Angel:    Thank you for allowing me to participate in your care. Your recent test results were reviewed and listed below.      Your results are provided below for your review  Results for orders placed or performed in visit on 08/19/19   BASIC METABOLIC PANEL   Result Value Ref Range    Sodium 136 133 - 144 mmol/L    Potassium 4.4 3.4 - 5.3 mmol/L    Chloride 104 94 - 109 mmol/L    Carbon Dioxide 30 20 - 32 mmol/L    Anion Gap 2 (L) 3 - 14 mmol/L    Glucose 111 (H) 70 - 99 mg/dL    Urea Nitrogen 16 7 - 30 mg/dL    Creatinine 0.88 0.66 - 1.25 mg/dL    GFR Estimate 89 >60 mL/min/[1.73_m2]    GFR Estimate If Black >90 >60 mL/min/[1.73_m2]    Calcium 9.5 8.5 - 10.1 mg/dL   HEMOGLOBIN A1C   Result Value Ref Range    Hemoglobin A1C 6.5 (H) 0 - 5.6 %   Lipid panel reflex to direct LDL Fasting   Result Value Ref Range    Cholesterol 86 <200 mg/dL    Triglycerides 79 <150 mg/dL    HDL Cholesterol 34 (L) >39 mg/dL    LDL Cholesterol Calculated 36 <100 mg/dL    Non HDL Cholesterol 52 <130 mg/dL   PSA, screen   Result Value Ref Range    PSA 0.57 0 - 4 ug/L                 Thank you for choosing [facility]. As a result, please continue with the treatment plan discussed in the office. Return as discussed or sooner if symptoms worsen or fail to improve.     If you have any further questions or concerns, please do not hesitate to contact us.      Sincerely,        Dr. Ric Umanzor/[facility]

## 2020-01-02 ENCOUNTER — OFFICE VISIT (OUTPATIENT)
Dept: PODIATRY | Facility: CLINIC | Age: 70
End: 2020-01-02
Payer: COMMERCIAL

## 2020-01-02 VITALS — WEIGHT: 298 LBS | HEIGHT: 76 IN | BODY MASS INDEX: 36.29 KG/M2

## 2020-01-02 DIAGNOSIS — L84 CORN OR CALLUS: ICD-10-CM

## 2020-01-02 DIAGNOSIS — E11.9 TYPE 2 DIABETES MELLITUS WITHOUT COMPLICATION, WITHOUT LONG-TERM CURRENT USE OF INSULIN (H): ICD-10-CM

## 2020-01-02 DIAGNOSIS — B35.3 TINEA PEDIS OF BOTH FEET: Primary | ICD-10-CM

## 2020-01-02 PROCEDURE — 11056 PARNG/CUTG B9 HYPRKR LES 2-4: CPT | Performed by: PODIATRIST

## 2020-01-02 PROCEDURE — 99203 OFFICE O/P NEW LOW 30 MIN: CPT | Mod: 25 | Performed by: PODIATRIST

## 2020-01-02 ASSESSMENT — MIFFLIN-ST. JEOR: SCORE: 2228.22

## 2020-01-02 NOTE — LETTER
1/2/2020         RE: Angel Hill  [address]  [place] [address]        Dear Colleague,    Thank you for referring your patient, Angel Hill, to the [facility]. Please see a copy of my visit note below.    PATIENT HISTORY:  Angel Hill is a 69 year old male who presents to clinic for a diabetic foot evaluation.  The patient relates pain with ambulation in shoe gear.  The patient relates that the calluses are difficult to trim at home.  The patient relates blood sugars are within normal limits.  The patient denies any redness, swelling or open sores on both feet.         REVIEW OF SYSTEMS:  Constitutional, HEENT, cardiovascular, pulmonary, GI, , musculoskeletal, neuro, skin, endocrine and psych systems are negative, except as otherwise noted.     PAST MEDICAL HISTORY:   Past Medical History:   Diagnosis Date     Melanoma of skin, site unspecified     melanoma     Urethritis, unspecified     ulcer 20 + years ago        PAST SURGICAL HISTORY:   Past Surgical History:   Procedure Laterality Date     ABLATE VEIN VARICOSE RADIO FREQUENCY WITHOUT PHLEBECTOMY MULTIPLE STAB  9/20/2012    Procedure: ABLATE VEIN VARICOSE RADIO FREQUENCY WITHOUT PHLEBECTOMY MULTIPLE STAB;  Right Leg VNUS Ablation, Right ankle ulcer debridement;  Surgeon: Jordan Lopez MD;  Location: [facility]     COLONOSCOPY  9/19/2002     COLONOSCOPY N/A 2/23/2017    Procedure: COLONOSCOPY;  Surgeon: Georges Kiser MD;  Location: [facility]     ESOPHAGOSCOPY, GASTROSCOPY, DUODENOSCOPY (EGD), COMBINED N/A 2/23/2017    Procedure: COMBINED ESOPHAGOSCOPY, GASTROSCOPY, DUODENOSCOPY (EGD), BIOPSY SINGLE OR MULTIPLE;  Surgeon: Georges Kiser MD;  Location: [facility]     SURGICAL HISTORY OF -       cyst removed from face     SURGICAL HISTORY OF -       imapacted wisdom teeth     SURGICAL HISTORY OF -   1997    hammer toe     SURGICAL HISTORY OF -   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  Ok to substitute alternative if insurance prefers., Disp: 100 strip, Rfl: 11     blood glucose monitoring (SHARATH MICROLET) lancets, Use to test blood sugar 4 times daily or as directed., Disp: 100 each, Rfl: 11     Cholecalciferol (VITAMIN D3 PO), Take 1,000 Units by mouth daily, Disp: , Rfl:      coenzyme Q-10 200 MG CAPS, , Disp: , Rfl:      fish oil-omega-3 fatty acids (OMEGA 3) 1000 MG capsule, Take 2 g by mouth daily, Disp: , Rfl:      metFORMIN (GLUCOPHAGE-XR) 500 MG 24 hr tablet, Take 2 tablets (1,000 mg) by mouth 2 times daily (with meals), Disp: 180 tablet, Rfl: 3     metoprolol succinate ER (TOPROL-XL) 200 MG 24 hr tablet, Take 1 tablet (200 mg) by mouth daily, Disp: 90 tablet, Rfl: 3     tamsulosin (FLOMAX) 0.4 MG capsule, Take 1 capsule (0.4 mg) by mouth daily, Disp: 90 capsule, Rfl: 3     ALLERGIES:    Allergies   Allergen Reactions     Artificial Sweetner (Informational Only) [Artificial Sweetner (Informational Only) ] Other (See Comments)     Aggravated acid reflex      Gluten Meal Other (See Comments)     aggravates to acid reflex     Nka [No Known Allergies]         SOCIAL HISTORY:   Social History     Socioeconomic History     Marital status: Single     Spouse name: Not on file     Number of children: Not on file     Years of education: Not on file     Highest education level: Not on file   Occupational History     Not on file   Social Needs     Financial resource strain: Not on file     Food insecurity:     Worry: Not on file     Inability: Not on file     Transportation needs:     Medical: Not on  "file     Non-medical: Not on file   Tobacco Use     Smoking status: Former Smoker     Last attempt to quit: 1988     Years since quittin.0     Smokeless tobacco: Former User     Tobacco comment:    Substance and Sexual Activity     Alcohol use: Not Currently     Comment: 1 beers once a month     Drug use: No     Sexual activity: Not Currently   Lifestyle     Physical activity:     Days per week: Not on file     Minutes per session: Not on file     Stress: Not on file   Relationships     Social connections:     Talks on phone: Not on file     Gets together: Not on file     Attends Confucianism service: Not on file     Active member of club or organization: Not on file     Attends meetings of clubs or organizations: Not on file     Relationship status: Not on file     Intimate partner violence:     Fear of current or ex partner: Not on file     Emotionally abused: Not on file     Physically abused: Not on file     Forced sexual activity: Not on file   Other Topics Concern     Parent/sibling w/ CABG, MI or angioplasty before 65F 55M? No   Social History Narrative     Not on file        FAMILY HISTORY:   Family History   Problem Relation Age of Onset     Musculoskeletal Disorder Mother      Cancer Father      Diabetes Father      Prostate Cancer Father      Colon Cancer Father      Hypertension Maternal Grandmother      Heart Disease Maternal Grandfather      Heart Disease Paternal Grandmother      Heart Disease Paternal Grandfather      Diabetes Paternal Grandfather      Musculoskeletal Disorder Sister         foot problems     Unknown/Adopted Sister      Unknown/Adopted Brother      Diabetes Paternal Half-Sister         EXAM:Vitals: Ht 1.93 m (6' 4\")   Wt 135.2 kg (298 lb)   BMI 36.27 kg/m     BMI= Body mass index is 36.27 kg/m .    Weight management plan: Patient was referred to their PCP to discuss a diet and exercise plan.    General appearance: Patient is alert and fully cooperative with history & " exam.  No sign of distress is noted during the visit.     Psychiatric: Affect is pleasant & appropriate.  Patient appears motivated to improve health.     Respiratory: Breathing is regular & unlabored while sitting.     HEENT: Hearing is intact to spoken word.  Speech is clear.  No gross evidence of visual impairment that would impact ambulation.     Dermatologic: Skin is intact to both lower extremities without significant lesions, rash or abrasion.  No paronychia or evidence of soft tissue infection is noted.  The nails are hypertrophic and discolored bilateral feet.  Noted hyperkeratotic skin lesions on the plantar aspects of both feet.  No surrounding erythema or subdermal hemorrhage noted.  The skin is xerotic, erythematous and hyperkeratotic.     Vascular: DP & PT pulses are intact & regular bilaterally.  No significant edema or varicosities noted.  CFT and skin temperature is normal to both lower extremities.  There are noted varicosities, no edema and noted trophic changes noted.      Neurologic: Lower extremity sensation is intact to light touch.  No evidence of weakness or contracture in the lower extremities.        Musculoskeletal: Patient is ambulatory without assistive device or brace.  No gross ankle deformity noted.  No foot or ankle joint effusion is noted.        Assessment:    1.  Diabetes Mellitus with Peripheral Neuropathy.  2.  Tinea pedis bilateral feet  3.   Corn both feet.      Plan:  I have explained to the patient the condition.  I have discussed with the patient the importance of diabetic foot care.  The patient was advised to apply his antifungal cream at night under plastic occlusion for better results.  The hyperkeratotic skin lesions were sharply debrided with a #10 blade down to healthy epidermis.  No bleeding noted.  The patient was advised to wear the soft cushioning insole to better offload the pressure causing the callus formation.         KRIS Smalls D.P.M.,  [email or web address].          Again, thank you for allowing me to participate in the care of your patient.        Sincerely,        Adeel Smalls DPM

## 2020-01-02 NOTE — PROGRESS NOTES
PATIENT HISTORY:  Angel Hill is a 69 year old male who presents to clinic for a diabetic foot evaluation.  The patient relates pain with ambulation in shoe gear.  The patient relates that the calluses are difficult to trim at home.  The patient relates blood sugars are within normal limits.  The patient denies any redness, swelling or open sores on both feet.         REVIEW OF SYSTEMS:  Constitutional, HEENT, cardiovascular, pulmonary, GI, , musculoskeletal, neuro, skin, endocrine and psych systems are negative, except as otherwise noted.     PAST MEDICAL HISTORY:   Past Medical History:   Diagnosis Date     Melanoma of skin, site unspecified     melanoma     Urethritis, unspecified     ulcer 20 + years ago        PAST SURGICAL HISTORY:   Past Surgical History:   Procedure Laterality Date     ABLATE VEIN VARICOSE RADIO FREQUENCY WITHOUT PHLEBECTOMY MULTIPLE STAB  9/20/2012    Procedure: ABLATE VEIN VARICOSE RADIO FREQUENCY WITHOUT PHLEBECTOMY MULTIPLE STAB;  Right Leg VNUS Ablation, Right ankle ulcer debridement;  Surgeon: Jordan Lopez MD;  Location: [facility]     COLONOSCOPY  9/19/2002     COLONOSCOPY N/A 2/23/2017    Procedure: COLONOSCOPY;  Surgeon: Georges Kiser MD;  Location: [facility]     ESOPHAGOSCOPY, GASTROSCOPY, DUODENOSCOPY (EGD), COMBINED N/A 2/23/2017    Procedure: COMBINED ESOPHAGOSCOPY, GASTROSCOPY, DUODENOSCOPY (EGD), BIOPSY SINGLE OR MULTIPLE;  Surgeon: Georges Kiser MD;  Location: [facility]     SURGICAL HISTORY OF -       cyst removed from face     SURGICAL HISTORY OF -       imapacted wisdom teeth     SURGICAL HISTORY OF -   1997    hammer toe     SURGICAL HISTORY OF -   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  Ok to substitute alternative if insurance prefers., Disp: 100 strip, Rfl: 11     blood glucose monitoring (SHARATH MICROLET) lancets, Use to test blood sugar 4 times daily or as directed., Disp: 100 each, Rfl: 11     Cholecalciferol (VITAMIN D3 PO), Take 1,000 Units by mouth daily, Disp: , Rfl:      coenzyme Q-10 200 MG CAPS, , Disp: , Rfl:      fish oil-omega-3 fatty acids (OMEGA 3) 1000 MG capsule, Take 2 g by mouth daily, Disp: , Rfl:      metFORMIN (GLUCOPHAGE-XR) 500 MG 24 hr tablet, Take 2 tablets (1,000 mg) by mouth 2 times daily (with meals), Disp: 180 tablet, Rfl: 3     metoprolol succinate ER (TOPROL-XL) 200 MG 24 hr tablet, Take 1 tablet (200 mg) by mouth daily, Disp: 90 tablet, Rfl: 3     tamsulosin (FLOMAX) 0.4 MG capsule, Take 1 capsule (0.4 mg) by mouth daily, Disp: 90 capsule, Rfl: 3     ALLERGIES:    Allergies   Allergen Reactions     Artificial Sweetner (Informational Only) [Artificial Sweetner (Informational Only) ] Other (See Comments)     Aggravated acid reflex      Gluten Meal Other (See Comments)     aggravates to acid reflex     Nka [No Known Allergies]         SOCIAL HISTORY:   Social History     Socioeconomic History     Marital status: Single     Spouse name: Not on file     Number of children: Not on file     Years of education: Not on file     Highest education level: Not on file   Occupational History     Not on file   Social Needs     Financial resource strain: Not on file     Food insecurity:     Worry: Not on file     Inability: Not on file     Transportation needs:     Medical: Not on file     Non-medical: Not on file   Tobacco Use     Smoking status: Former Smoker     Last attempt to quit: 1988     Years since quittin.0     Smokeless tobacco: Former User     Tobacco comment:    Substance and Sexual Activity     Alcohol  "use: Not Currently     Comment: 1 beers once a month     Drug use: No     Sexual activity: Not Currently   Lifestyle     Physical activity:     Days per week: Not on file     Minutes per session: Not on file     Stress: Not on file   Relationships     Social connections:     Talks on phone: Not on file     Gets together: Not on file     Attends Moravian service: Not on file     Active member of club or organization: Not on file     Attends meetings of clubs or organizations: Not on file     Relationship status: Not on file     Intimate partner violence:     Fear of current or ex partner: Not on file     Emotionally abused: Not on file     Physically abused: Not on file     Forced sexual activity: Not on file   Other Topics Concern     Parent/sibling w/ CABG, MI or angioplasty before 65F 55M? No   Social History Narrative     Not on file        FAMILY HISTORY:   Family History   Problem Relation Age of Onset     Musculoskeletal Disorder Mother      Cancer Father      Diabetes Father      Prostate Cancer Father      Colon Cancer Father      Hypertension Maternal Grandmother      Heart Disease Maternal Grandfather      Heart Disease Paternal Grandmother      Heart Disease Paternal Grandfather      Diabetes Paternal Grandfather      Musculoskeletal Disorder Sister         foot problems     Unknown/Adopted Sister      Unknown/Adopted Brother      Diabetes Paternal Half-Sister         EXAM:Vitals: Ht 1.93 m (6' 4\")   Wt 135.2 kg (298 lb)   BMI 36.27 kg/m    BMI= Body mass index is 36.27 kg/m .    Weight management plan: Patient was referred to their PCP to discuss a diet and exercise plan.    General appearance: Patient is alert and fully cooperative with history & exam.  No sign of distress is noted during the visit.     Psychiatric: Affect is pleasant & appropriate.  Patient appears motivated to improve health.     Respiratory: Breathing is regular & unlabored while sitting.     HEENT: Hearing is intact to spoken " word.  Speech is clear.  No gross evidence of visual impairment that would impact ambulation.     Dermatologic: Skin is intact to both lower extremities without significant lesions, rash or abrasion.  No paronychia or evidence of soft tissue infection is noted.  The nails are hypertrophic and discolored bilateral feet.  Noted hyperkeratotic skin lesions on the plantar aspects of both feet.  No surrounding erythema or subdermal hemorrhage noted.  The skin is xerotic, erythematous and hyperkeratotic.     Vascular: DP & PT pulses are intact & regular bilaterally.  No significant edema or varicosities noted.  CFT and skin temperature is normal to both lower extremities.  There are noted varicosities, no edema and noted trophic changes noted.      Neurologic: Lower extremity sensation is intact to light touch.  No evidence of weakness or contracture in the lower extremities.        Musculoskeletal: Patient is ambulatory without assistive device or brace.  No gross ankle deformity noted.  No foot or ankle joint effusion is noted.        Assessment:    1.  Diabetes Mellitus with Peripheral Neuropathy.  2.  Tinea pedis bilateral feet  3.   Corn both feet.      Plan:  I have explained to the patient the condition.  I have discussed with the patient the importance of diabetic foot care.  The patient was advised to apply his antifungal cream at night under plastic occlusion for better results.  The hyperkeratotic skin lesions were sharply debrided with a #10 blade down to healthy epidermis.  No bleeding noted.  The patient was advised to wear the soft cushioning insole to better offload the pressure causing the callus formation.         KRIS Smalls D.P.M., [email or web address].

## 2020-01-02 NOTE — PATIENT INSTRUCTIONS
"DIABETES AND YOUR FEET  Diabetes can result in several problems in the feet including ulcers (open sores) and amputations. Two of the most important reasons why people develop foot problems when they have diabetes is : 1. Neuropathy (loss of feeling)  2. Vascular disease (loss or decrease of blood flow).    Neuropathy is a term used to describe a loss of nerve function.  Patients with diabetes are at risk of developing neuropathy if their sugars continue to run high and are above the normal value. One theory for neuropathy is that the \"extra\" sugar in the body enters the nerves and is broken down. These by-products build up in the nerve causing it to swell and impairing nerve function. Often times, this can be prevented by controlling your sugars, dieting and exercise.    When a person develops neuropathy, they usually begin to feel numbness or tingling in their feet and sometime in their legs.  Other symptoms may include painful burning or hot feet, tingling or feeling like insects or ants are crawling on your feet or legs.  If the diabetes is sever and the sugars run high for long periods of time, neuropathy can also occur in the hands.    Vascular disease  is a term used to describe a loss or decrease in circulation (blood flow). There is a problem in getting blood and oxygen to areas that need it. Similar to neuropathy, sugars can build up in the walls of the arteries (blood vessels) and cause them to become swollen, thickened and hardened. This decreases the amount of blood that can go to an area that needs it. Though this is common in the legs of diabetic patients, it can also affect other arteries (blood vessels) in the body such as in the heart and eyes.    In the legs, vascular disease usually results in cramping. Patients who develop leg cramps after walking the same distance every time (i.e. One block, half a mile, ect.) need to let their doctors know so that their circulation may be checked. Cramps " causing severe pain in the feet and/or legs while sleeping and the cramps go away when you stand or hang your legs off the side of the bed, may also be a sign of poor blood circulation.  Occasional cramping in cold weather or on rare occasions with activity may not be due to poor circulation, but you should inform your doctor.    PREVENTION OF THESE DISEASES  The key to prevention is good blood sugar control. Poor blood sugar control is a big reason many of these problems start. Physical activity (exercise) is a very good way to help decrease your blood sugars. Exercise can lower your blood sugar, blood pressure, and cholesterol. It also reduces your risk for heart disease and stroke, relieves stress, and strengthens your heart, muscles and bones.  In addition, regular activity helps insulin work better, improves your blood circulation, and keeps your joints flexible. If you're trying to lose weight, a combination of exercise and wise food choices can help you reach your target weight and maintain it.      PAIN MANAGEMENT  1.Blood Sugar Control - Most important  2. Medications such as:  Amytriptylline, duloxetine, gabapentin, lyrica, tramadol  3. Nutritional therapy:  Vitamin B6 (100mg daily), Vitamin B12 (75mcg daily), Vitamin D 2000 IU daily), Alpha-Lipoic Acid (600-1800mg daily), Acetyl-L-Carnitine (500-1000mg TID, L-methyl folate (1500mcg daily)    ** Metformin can block Vitamin B6 and B12 so it is important to supplement**    FOOT CARE RECOMMENDATIONS   1. Wash your feet with lukewarm water and a mild soap and then dry them thoroughly, especially between the toes.     2. Examine your feet daily looking for cuts, corns, blisters, cracks, ect, especially after wearing new shoes. Make sure to look between your toes. If you cannot see the bottom of your feet, set a mirror on the floor and hold your foot over it, or ask a spouse, friend or family member to examine your feet for you. Contact your doctor immediately  if new problems are noted or if sores are not healing.     3. Immediately apply moisturizer to the tops and bottoms of your feet, avoiding areas between the toes. Hand lotion (Intesive Care, Carlita, Eucerin, Neutrogena, Curel, ect) is sufficient unless your doctor prescribes a medicated lotion. Apply sunscreen to your feet when going swimming outside.     4. Use clean comfortable shoes, wear white socks (if you have any bleeding or drainage, you will see it on white socks). Socks should not have thick seams or cut off the circulation around the leg. Break in new shoes slowly and rotate with older shoes until broken in. Check the inside of your shoes with your hand to look for areas of irritation or objects that may have fallen into your shoes.       5. Keep slippers by the side of your bed for use during the night.     6.  Shoes should be fitted by a professional and should not cause areas of irritation.  Check your feet regularly when wearing a new pair of shoes and replace them as needed.     7.  Talk to your doctor about proper exercise. Exercise and stretching stimulate blood flow to your feet and maintain proper glucose levels.     8.  Monitor your blood glucose level as instructed by your doctor. Notify your doctor immediately if your blood sugar is abnormally high or low.    9. Cut your nails straight across, but then gently round any sharp edges with a cardboard nail file. If you have neuropathy, peripheral vascular disease or cannot see that well to trim your own toenails contact [facility] ([phone]) or [facility] ([phone]).      THINGS TO AVOID DOING   1.  Do not soak your feet if you have an open sore. Use only lukewarm water and always check the temperature with your hand as hot water can easily burn your feet.       2.  Never use a hot water bottle or heating pad on your feet. Also do not apply cold compresses to your feet. With decreased sensation, you could burn or freeze your feet.        3.  Do not apply any of these to your feet:    -  Over the counter medicine for corns or warts    -  Harsh chemicals like boric acid    -  Do not self-treat corns, cuts, blisters or infections. Always consult your doctor.       4.  Do not wear sandals, slippers or walk barefoot, especially on hot sand or concrete or other harsh surfaces.     5.  If you smoke, stop!!!        CALLUS / CORNS / IPKs  When there is excessive friction or pressure on the skin, the body responds by making the skin thicker to protect the deeper structures from becoming exposed. While this works well to protect the deeper structures, the thickened skin can increase pressure and pain.    CALLUS: Flat, diffuse thickening are simple calluses and they are usually caused by friction. Often these are the result of rubbing on a shoe or going barefoot.    CORNS: Calluses with a central core between the toes are called corns. These result from prominent joints on adjacent toes rubbing together. Theses are a symptom of bone malalignment and will always recur unless the underlying bones are addressed surgically.    IPKs: Calluses with a central core on the ball of the foot are usually IPKs (intractable plantar keratosis). These are caused by excessive pressure from the metatarsals, the bones that make up the ball of the foot. Often one of these bones is too long or too prominent.  Again, these will always recur unless the underlying bone issue is addressed. There is no cure for these. They will either go away by themselves, recur, or more could develop.    ROUTINE MAINTENANCE  1. File them down with a pumice stone or callus file a couple times a week.   2. An electric callus removing device. Amope Pedi Perfect Electronic Pedicure Foot File and Callus Remover can be a good option.   3. Lotion can be applied to soften the callus. A urea based cream such as Kersal or Vanicream or thicker cream with shea butter are good options.  4. Toe spacers or toe  covers can be used for corns, gel pads can be used for other lesions on the bottom of the foot.   If there is a surgical pathology noted, such as a prominent bone, often this needs to be addressed surgically to minimize recurrence. However, sometimes the lesion simply migrates to another spot after surgery, so it is not a guaranteed cure.     There was also discussion of the cost structure of callus care if they were to come back and have it treated in the clinic. Explained that if insurance does not cover it, they would be billed. This charge could range from $100 - $160.  They were also provided information on places to get the callus treatment.

## 2020-01-30 ENCOUNTER — OFFICE VISIT (OUTPATIENT)
Dept: FAMILY MEDICINE | Facility: CLINIC | Age: 70
End: 2020-01-30
Payer: COMMERCIAL

## 2020-01-30 VITALS
DIASTOLIC BLOOD PRESSURE: 76 MMHG | OXYGEN SATURATION: 97 % | BODY MASS INDEX: 38.31 KG/M2 | HEIGHT: 76 IN | SYSTOLIC BLOOD PRESSURE: 142 MMHG | TEMPERATURE: 96.7 F | HEART RATE: 56 BPM | WEIGHT: 314.6 LBS | RESPIRATION RATE: 12 BRPM

## 2020-01-30 DIAGNOSIS — E11.9 TYPE 2 DIABETES MELLITUS WITHOUT COMPLICATION, WITHOUT LONG-TERM CURRENT USE OF INSULIN (H): ICD-10-CM

## 2020-01-30 LAB
CREAT UR-MCNC: 135 MG/DL
HBA1C MFR BLD: 6.9 % (ref 0–5.6)
MICROALBUMIN UR-MCNC: 29 MG/L
MICROALBUMIN/CREAT UR: 21.48 MG/G CR (ref 0–17)

## 2020-01-30 PROCEDURE — 36415 COLL VENOUS BLD VENIPUNCTURE: CPT | Performed by: NURSE PRACTITIONER

## 2020-01-30 PROCEDURE — 99213 OFFICE O/P EST LOW 20 MIN: CPT | Performed by: NURSE PRACTITIONER

## 2020-01-30 PROCEDURE — 82043 UR ALBUMIN QUANTITATIVE: CPT | Performed by: NURSE PRACTITIONER

## 2020-01-30 PROCEDURE — 83036 HEMOGLOBIN GLYCOSYLATED A1C: CPT | Performed by: NURSE PRACTITIONER

## 2020-01-30 RX ORDER — METFORMIN HCL 500 MG
1000 TABLET, EXTENDED RELEASE 24 HR ORAL 2 TIMES DAILY WITH MEALS
Qty: 180 TABLET | Refills: 3 | Status: SHIPPED | OUTPATIENT
Start: 2020-01-30 | End: 2020-08-07

## 2020-01-30 ASSESSMENT — MIFFLIN-ST. JEOR: SCORE: 2303.52

## 2020-01-30 NOTE — Clinical Note
Tests that can be patient reported without a hard copy:Eye exam with ophthalmology on this date: Andres Zapata- 04/2019

## 2020-01-30 NOTE — PATIENT INSTRUCTIONS
"  Patient Education   Diabetes ABCs  Work with Your Health Care Team to Manage Diabetes!     A1c (hemoglobin A1c test):  Check at least every 6 months.  Goal without diabetes: Less than 6%  Goal with diabetes: Less than 7%, but your doctor may have a different goal for you.  My Goal: ___________________   BG (blood glucose):  Goals without diabetes:  Before meals: 60 to 99 mg/dL  After meals (2 hours): under 140 mg/dL  Goals with diabetes:   Before meals: 80 to 130 mg/dL  After meals: (2 hours): under 180 mg/dL  My Goals:  Before meals: __________  After meals: ___________   Blood pressure:  Check at every doctor visit.   Goal: Less than 140/90    Cholesterol:   Check at least 1 time a year.  Goals for LDL (\"bad\" cholesterol):  With heart disease: Less than 70 mg/dL  Without heart disease: Less than 100 mg/dL   Eyes:   Have a dilated eye exam every year.   Teeth:   See your dentist twice a year. People with diabetes have a higher risk of gum disease.  Smoking:   If you smoke, it's important to quit. Make a plan with help from your health care team.  Weight:   Work towards a healthy weight with help from your diabetes educator or dietitian.  Kidneys:     Check your urine protein 1 time each year.    Protect your kidneys by keeping your blood pressure normal.  Feet:    Check your feet every day for signs of injury, dry skin, cracks, cuts, swelling, or blisters.    Ask your doctor to check your feet at every visit.    Wear shoes and socks that fit well.    Don't go barefoot.  Sex:   Talk about erectile dysfunction (ED) and female sexual dysfunction (FSD) with your doctor.  For informational purposes only. Not to replace the advice of your health care provider. Copyright   2018 [employer]. All rights reserved. Illustration ID [id number]   [id number]  [employer]. Clinically reviewed by [employer]. [employer] 549255 - 10/18.       "

## 2020-01-30 NOTE — LETTER
[facility]  [address]  [place] [address]  Phone: [phone]      2/3/2020     Angel Hill  [address]   [place] [address]      Dear Angel:    Thank you for allowing me to participate in your care. Your recent test results were reviewed and listed below.      Your results are provided below for your review    Results for orders placed or performed in visit on 01/30/20   Hemoglobin A1c     Status: Abnormal   Result Value Ref Range    Hemoglobin A1C 6.9 (H) 0 - 5.6 %   Albumin Random Urine Quantitative with Creat Ratio     Status: Abnormal   Result Value Ref Range    Creatinine Urine 135 mg/dL    Albumin Urine mg/L 29 mg/L    Albumin Urine mg/g Cr 21.48 (H) 0 - 17 mg/g Cr          A1c is at goal (< 7). There is protein in the urine this time- meaning elevated blood sugars and/or blood pressures are affecting the kidney's. I recommend we add on a low dose medication called Lisinopril to bring the blood pressure down a bit and protect the kidneys. Let me know if you are willing to do this and I will send it to the pharmacy. You can call the clinic and ask to speak with the care team for Franchesca Brennan. [phone]- Please let us know if you have any questions.         Thank you for choosing [facility]. As a result, please continue with the treatment plan discussed in the office. Return as discussed or sooner if symptoms worsen or fail to improve.     If you have any further questions or concerns, please do not hesitate to contact us.      Sincerely,        DAGOBERTO Nazario CNP /mh

## 2020-01-30 NOTE — PROGRESS NOTES
Subjective     Angel Hill is a 69 year old male who presents to clinic today for the following health issues:    HPI   Diabetes Follow-up      How often are you checking your blood sugar? Couple times a week     What concerns do you have today about your diabetes? None     Do you have any of these symptoms? (Select all that apply)  Numbness in feet    Have you had a diabetic eye exam in the last 12 months? Yes- Date of last eye exam: Unsure but done at [facility] just before it closed last year    Please abstract the following data from this visit with this patient into the appropriate field in Epic:    Tests that can be patient reported without a hard copy:    Eye exam with ophthalmology on this date: [facility]- Dr. Zapata- 04/2019        BP Readings from Last 2 Encounters:   01/30/20 (!) 142/76   08/19/19 131/78     Hemoglobin A1C (%)   Date Value   08/19/2019 6.5 (H)   01/29/2019 7.3 (H)     LDL Cholesterol Calculated (mg/dL)   Date Value   08/19/2019 36   08/03/2018 87           How many servings of fruits and vegetables do you eat daily?  0-1    On average, how many sweetened beverages do you drink each day (Examples: soda, juice, sweet tea, etc.  Do NOT count diet or artificially sweetened beverages)?   0-1    How many days per week do you exercise enough to make your heart beat faster? 3 or less    How many minutes a day do you exercise enough to make your heart beat faster? 9 or less    How many days per week do you miss taking your medication? 0        Patient Active Problem List   Diagnosis     Melanoma of skin (H)     Urethritis     PROSTATE CARCINOMA     Obesity     CARDIOVASCULAR SCREENING; LDL GOAL LESS THAN 130     Advanced directives, counseling/discussion     Hypertension goal BP (blood pressure) < 140/90     Osteoarthritis of left hip, unspecified osteoarthritis type     Type 2 diabetes mellitus without complication, without long-term current use of insulin (H)     Obesity (BMI 35.0-39.9) with  comorbidity (H)     Past Surgical History:   Procedure Laterality Date     ABLATE VEIN VARICOSE RADIO FREQUENCY WITHOUT PHLEBECTOMY MULTIPLE STAB  2012    Procedure: ABLATE VEIN VARICOSE RADIO FREQUENCY WITHOUT PHLEBECTOMY MULTIPLE STAB;  Right Leg VNUS Ablation, Right ankle ulcer debridement;  Surgeon: Jordan Lopez MD;  Location: [facility]     COLONOSCOPY  2002     COLONOSCOPY N/A 2017    Procedure: COLONOSCOPY;  Surgeon: Georges Kiser MD;  Location: [facility]     ESOPHAGOSCOPY, GASTROSCOPY, DUODENOSCOPY (EGD), COMBINED N/A 2017    Procedure: COMBINED ESOPHAGOSCOPY, GASTROSCOPY, DUODENOSCOPY (EGD), BIOPSY SINGLE OR MULTIPLE;  Surgeon: Georges Kiser MD;  Location: [facility]     SURGICAL HISTORY OF -       cyst removed from face     SURGICAL HISTORY OF -       imapacted wisdom teeth     SURGICAL HISTORY OF -       hammer toe     SURGICAL HISTORY OF -       metastalic melanoma     SURGICAL HISTORY OF -       left axillary node removal     SURGICAL HISTORY OF -       prostate cryoablation       Social History     Tobacco Use     Smoking status: Former Smoker     Last attempt to quit: 1988     Years since quittin.1     Smokeless tobacco: Former User     Tobacco comment:    Substance Use Topics     Alcohol use: Not Currently     Comment: 1 beers once a month     Family History   Problem Relation Age of Onset     Musculoskeletal Disorder Mother      Cancer Father      Diabetes Father      Prostate Cancer Father      Colon Cancer Father      Hypertension Maternal Grandmother      Heart Disease Maternal Grandfather      Heart Disease Paternal Grandmother      Heart Disease Paternal Grandfather      Diabetes Paternal Grandfather      Musculoskeletal Disorder Sister         foot problems     Unknown/Adopted Sister      Unknown/Adopted Brother      Diabetes Paternal Half-Sister              Reviewed and updated as needed this visit by Provider         Review of Systems   ROS  "COMP: Constitutional, HEENT, cardiovascular, pulmonary, gi and gu systems are negative, except as otherwise noted.      Objective    BP (!) 142/76 (BP Location: Right arm, Patient Position: Sitting, Cuff Size: Adult Large)   Pulse 56   Temp 96.7  F (35.9  C) (Tympanic)   Resp 12   Ht 1.93 m (6' 4\")   Wt 142.7 kg (314 lb 9.6 oz)   SpO2 97%   BMI 38.29 kg/m    Body mass index is 38.29 kg/m .  Physical Exam   GENERAL: healthy, alert and no distress  RESP: lungs clear to auscultation - no rales, rhonchi or wheezes  CV: regular rates and rhythm, normal S1 S2, no S3 or S4 and no murmur, click or rub  MS: no gross musculoskeletal defects noted, no edema  NEURO: Normal strength and tone, mentation intact and speech normal    Diagnostic Test Results:  Labs reviewed in Epic  Results for orders placed or performed in visit on 01/30/20 (from the past 24 hour(s))   Hemoglobin A1c   Result Value Ref Range    Hemoglobin A1C 6.9 (H) 0 - 5.6 %           Assessment & Plan       ICD-10-CM    1. Type 2 diabetes mellitus without complication, without long-term current use of insulin (H) E11.9 metFORMIN (GLUCOPHAGE-XR) 500 MG 24 hr tablet     Hemoglobin A1c     Albumin Random Urine Quantitative with Creat Ratio          FUTURE APPOINTMENTS:       - Follow-up visit in 6 months, sooner PRN.  Health Maintenance reviewed - urine microalbumin ordered, patient asked to schedule diabetic eye exam.  DM controlled, continue current meds    Patient Instructions       Patient Education   Diabetes ABCs  Work with Your Health Care Team to Manage Diabetes!     A1c (hemoglobin A1c test):  Check at least every 6 months.  Goal without diabetes: Less than 6%  Goal with diabetes: Less than 7%, but your doctor may have a different goal for you.  My Goal: ___________________   BG (blood glucose):  Goals without diabetes:  Before meals: 60 to 99 mg/dL  After meals (2 hours): under 140 mg/dL  Goals with diabetes:   Before meals: 80 to 130 mg/dL  After " "meals: (2 hours): under 180 mg/dL  My Goals:  Before meals: __________  After meals: ___________   Blood pressure:  Check at every doctor visit.   Goal: Less than 140/90    Cholesterol:   Check at least 1 time a year.  Goals for LDL (\"bad\" cholesterol):  With heart disease: Less than 70 mg/dL  Without heart disease: Less than 100 mg/dL   Eyes:   Have a dilated eye exam every year.   Teeth:   See your dentist twice a year. People with diabetes have a higher risk of gum disease.  Smoking:   If you smoke, it's important to quit. Make a plan with help from your health care team.  Weight:   Work towards a healthy weight with help from your diabetes educator or dietitian.  Kidneys:     Check your urine protein 1 time each year.    Protect your kidneys by keeping your blood pressure normal.  Feet:    Check your feet every day for signs of injury, dry skin, cracks, cuts, swelling, or blisters.    Ask your doctor to check your feet at every visit.    Wear shoes and socks that fit well.    Don't go barefoot.  Sex:   Talk about erectile dysfunction (ED) and female sexual dysfunction (FSD) with your doctor.  For informational purposes only. Not to replace the advice of your health care provider. Copyright   2018 [employer]. All rights reserved. Illustration ID [id number]   [id number]  [email or web address]. Clinically reviewed by [employer]. [employer] [id number] - 10/18.           No follow-ups on file.    DAGOBERTO Cruz [facility]      "

## 2020-01-31 NOTE — RESULT ENCOUNTER NOTE
Please send patient letter notifying of labs and provider message.    A1c is at goal (< 7). There is protein in the urine this time- meaning elevated blood sugars and/or blood pressures are affecting the kidney's. I recommend we add on a low dose medication called Lisinopril to bring the blood pressure down a bit and protect the kidneys. Let me know if you are willing to do this and I will send it to the pharmacy. You can call the clinic and ask to speak with the care team for Franchesca Brennan. [phone]- Please let us know if you have any questions.      Thanks,  DAGOBERTO Nazario CNP

## 2020-02-10 ENCOUNTER — TELEPHONE (OUTPATIENT)
Dept: UROLOGY | Facility: CLINIC | Age: 70
End: 2020-02-10

## 2020-02-10 NOTE — TELEPHONE ENCOUNTER
Reason for Call:  Other tesicle pain    Detailed comments: Pt states his testicle is very swollen , having dull pain, needs to be seen , no openings, please call    Phone Number Patient can be reached at: Home number on file [phone] (home)    Best Time: today    Can we leave a detailed message on this number? YES    Call taken on 2/10/2020 at 10:02 AM by Avelina Carty

## 2020-02-10 NOTE — TELEPHONE ENCOUNTER
Spoke to patient and he stated that he has been having swelling in his right testicle for a while now again and it is just becoming more uncomfortable. He reported this morning was a tough morning. When lying down he stated the testicle is 4-5 inches long and seems like the size of his fist. He stated it is starting to bother his right hip. Laying on the left side makes it feel better.  Pt stated he was seen years ago for this by Dr. Lopez and Dr. Umanzor. At that time it was not this big and bothersome. Pt reported that he had cryo therapy on the right side of prostate previously. Appt was made for patient.  Janis LOPES RN BSN PHN  Specialty Clinics

## 2020-02-18 ENCOUNTER — OFFICE VISIT (OUTPATIENT)
Dept: UROLOGY | Facility: CLINIC | Age: 70
End: 2020-02-18
Payer: COMMERCIAL

## 2020-02-18 VITALS
SYSTOLIC BLOOD PRESSURE: 137 MMHG | RESPIRATION RATE: 18 BRPM | TEMPERATURE: 97.6 F | DIASTOLIC BLOOD PRESSURE: 78 MMHG | HEART RATE: 52 BPM

## 2020-02-18 DIAGNOSIS — N50.9 DISORDER OF MALE GENITAL ORGANS, UNSPECIFIED: ICD-10-CM

## 2020-02-18 DIAGNOSIS — N43.3 HYDROCELE IN ADULT: Primary | ICD-10-CM

## 2020-02-18 PROCEDURE — 99213 OFFICE O/P EST LOW 20 MIN: CPT | Mod: 25 | Performed by: UROLOGY

## 2020-02-18 PROCEDURE — 51798 US URINE CAPACITY MEASURE: CPT | Performed by: UROLOGY

## 2020-02-18 NOTE — PATIENT INSTRUCTIONS
Per physician instructions.    If you have questions or concerns on any instructions given to you by your provider today or if you need to schedule an appointment, you can reach us at [phone].    Thank you!

## 2020-02-18 NOTE — PROGRESS NOTES
Appointment source: Established Patient  Patient name: Angel Hill  Urology Staff: Alex Nino MD    Subjective: This is a 69 year old year old male returning for follow up of testicular discomfort.    Concerned that the swelling has gotten worse. Has previously had epididymitis symptoms that he has attributed to his prior cryotherapy for prostate cancer.    Last PSA 8/19/2019 0.57 ng/mL minimally changed from 2018    Voiding symptoms are minimal    Objective:  Right hemiscrotum painless and consistent with hydrocele.    Prostate benign to palpation.    Post void residual 228 mL    Assessment:  Probable hydrocele enlargement of the right hemiscrotum    Plan:  Scrotal ultrasound. Follow up post void residual.    Total time 20 minutes, counseling 15 minutes discussing scrotal swelling.

## 2020-02-18 NOTE — NURSING NOTE
"Chief Complaint   Patient presents with     Consult     Testicular Swelling        Initial /78 (BP Location: Right arm, Patient Position: Chair, Cuff Size: Adult Large)   Pulse 52   Temp 97.6  F (36.4  C) (Tympanic)   Resp 18  Estimated body mass index is 38.29 kg/m  as calculated from the following:    Height as of 1/30/20: 1.93 m (6' 4\").    Weight as of 1/30/20: 142.7 kg (314 lb 9.6 oz).  BP completed using cuff size: large   Medications and allergies reviewed.      Rae LYNN CMA     "

## 2020-02-18 NOTE — NURSING NOTE
Active order to obtain bladder scan? Yes   Name of ordering provider:  [facility]   Bladder scan preformed post void Yes.  Bladder scan reveled 228ML  Provider notified?  Yes    Rae LYNN CMA

## 2020-02-19 ENCOUNTER — HOSPITAL ENCOUNTER (OUTPATIENT)
Dept: ULTRASOUND IMAGING | Facility: CLINIC | Age: 70
Discharge: HOME | End: 2020-02-19
Attending: UROLOGY | Admitting: UROLOGY
Payer: COMMERCIAL

## 2020-02-19 ENCOUNTER — TELEPHONE (OUTPATIENT)
Dept: FAMILY MEDICINE | Facility: CLINIC | Age: 70
End: 2020-02-19

## 2020-02-19 DIAGNOSIS — R80.9 MICROALBUMINURIA: ICD-10-CM

## 2020-02-19 DIAGNOSIS — N50.9 DISORDER OF MALE GENITAL ORGANS, UNSPECIFIED: ICD-10-CM

## 2020-02-19 DIAGNOSIS — E11.9 TYPE 2 DIABETES MELLITUS WITHOUT COMPLICATION, WITHOUT LONG-TERM CURRENT USE OF INSULIN (H): Primary | ICD-10-CM

## 2020-02-19 DIAGNOSIS — N43.3 HYDROCELE IN ADULT: ICD-10-CM

## 2020-02-19 PROCEDURE — 93976 VASCULAR STUDY: CPT

## 2020-02-19 NOTE — TELEPHONE ENCOUNTER
Patient is willing to try the lisinopril - please send to [facility] in [place].    Manda Gordon RN

## 2020-02-19 NOTE — TELEPHONE ENCOUNTER
Reason for Call:  Letter    Detailed comments: patient is calling and stating he got a letter with results and letting him know he needs a new medication. He would like to discuss this with an RN. Please call. He does use [facility] in [place] for a pharmacy.    Phone Number Patient can be reached at: Home number on file [phone] (home)    Best Time: any    Can we leave a detailed message on this number? YES   Nikole Alonzo  Clinic Station        Call taken on 2/19/2020 at 9:19 AM by Nikole Thomas

## 2020-02-20 RX ORDER — LISINOPRIL 5 MG/1
5 TABLET ORAL DAILY
Qty: 90 TABLET | Refills: 1 | Status: SHIPPED | OUTPATIENT
Start: 2020-02-20 | End: 2020-08-11

## 2020-02-20 NOTE — TELEPHONE ENCOUNTER
Rx sent. He should come in for BP check and lab in 1 month for surveillance. Thank you.  DAGOBERTO Nazario CNP

## 2020-04-09 ENCOUNTER — VIRTUAL VISIT (OUTPATIENT)
Dept: FAMILY MEDICINE | Facility: CLINIC | Age: 70
End: 2020-04-09
Payer: COMMERCIAL

## 2020-04-09 DIAGNOSIS — E11.9 TYPE 2 DIABETES MELLITUS WITHOUT COMPLICATION, WITHOUT LONG-TERM CURRENT USE OF INSULIN (H): Primary | ICD-10-CM

## 2020-04-09 DIAGNOSIS — Z13.6 CARDIOVASCULAR SCREENING; LDL GOAL LESS THAN 130: ICD-10-CM

## 2020-04-09 DIAGNOSIS — Z12.5 SCREENING FOR PROSTATE CANCER: ICD-10-CM

## 2020-04-09 DIAGNOSIS — R80.9 MICROALBUMINURIA: ICD-10-CM

## 2020-04-09 DIAGNOSIS — I10 HYPERTENSION GOAL BP (BLOOD PRESSURE) < 140/90: ICD-10-CM

## 2020-04-09 PROCEDURE — 99214 OFFICE O/P EST MOD 30 MIN: CPT | Mod: TEL | Performed by: NURSE PRACTITIONER

## 2020-04-09 NOTE — PROGRESS NOTES
"Subjective     Angel Hill is a 69 year old male who is being evaluated via a billable telephone visit.      The patient has been notified of following:     \"This telephone visit will be conducted via a call between you and your physician/provider. We have found that certain health care needs can be provided without the need for a physical exam.  This service lets us provide the care you need with a short phone conversation.  If a prescription is necessary we can send it directly to your pharmacy.  If lab work is needed we can place an order for that and you can then stop by our lab to have the test done at a later time.    Telephone visits are billed at different rates depending on your insurance coverage. During this emergency period, for some insurers they may be billed the same as an in-person visit.  Please reach out to your insurance provider with any questions.    If during the course of the call the physician/provider feels a telephone visit is not appropriate, you will not be charged for this service.\"    Patient has given verbal consent for Telephone visit?  Yes    This patient was recommended a virtual visit as opposed to an office visit as part of the social distancing recommendations to prevent the spread of COVID19 per CDC recommendations.       How would you like to obtain your AVS? Mail a copy    Angel Hill complains of   Chief Complaint   Patient presents with     Hypertension       ALLERGIES  Artificial sweetner (informational only) [artificial sweetner (informational only) ]; Gluten meal; and Nka [no known allergies]    Diabetes Follow-up    How often are you checking your blood sugar? A once a week  What time of day are you checking your blood sugars (select all that apply)?  After meals  Have you had any blood sugars above 200?  No  Have you had any blood sugars below 70?  No    What symptoms do you notice when your blood sugar is low?  None    What concerns do you have today about " your diabetes? None     Do you have any of these symptoms? (Select all that apply)  Numbness in feet    Have you had a diabetic eye exam in the last 12 months? Yes- Date of last eye exam: 4/1/2019,  Location: [place]              Hyperlipidemia Follow-Up      Are you regularly taking any medication or supplement to lower your cholesterol?   Yes- Atorvastatin.    Are you having muscle aches or other side effects that you think could be caused by your cholesterol lowering medication?  No    Hypertension Follow-up      Do you check your blood pressure regularly outside of the clinic? No     Are you following a low salt diet? No    Are your blood pressures ever more than 140 on the top number (systolic) OR more   than 90 on the bottom number (diastolic), for example 140/90? Yes    BP Readings from Last 2 Encounters:   02/18/20 137/78   01/30/20 (!) 142/76     Hemoglobin A1C (%)   Date Value   01/30/2020 6.9 (H)   08/19/2019 6.5 (H)     LDL Cholesterol Calculated (mg/dL)   Date Value   08/19/2019 36   08/03/2018 87         How many servings of fruits and vegetables do you eat daily?  0-1    On average, how many sweetened beverages do you drink each day (Examples: soda, juice, sweet tea, etc.  Do NOT count diet or artificially sweetened beverages)?   0-1    How many days per week do you exercise enough to make your heart beat faster? 3 or less    How many minutes a day do you exercise enough to make your heart beat faster? 20 - 29  How many days per week do you miss taking your medication? 1    What makes it hard for you to take your medications?  remembering to take      Patient Active Problem List   Diagnosis     Melanoma of skin (H)     Urethritis     PROSTATE CARCINOMA     Obesity     CARDIOVASCULAR SCREENING; LDL GOAL LESS THAN 130     Advanced directives, counseling/discussion     Hypertension goal BP (blood pressure) < 140/90     Osteoarthritis of left hip, unspecified osteoarthritis type     Type 2 diabetes  mellitus without complication, without long-term current use of insulin (H)     Obesity (BMI 35.0-39.9) with comorbidity (H)     Past Surgical History:   Procedure Laterality Date     ABLATE VEIN VARICOSE RADIO FREQUENCY WITHOUT PHLEBECTOMY MULTIPLE STAB  2012    Procedure: ABLATE VEIN VARICOSE RADIO FREQUENCY WITHOUT PHLEBECTOMY MULTIPLE STAB;  Right Leg VNUS Ablation, Right ankle ulcer debridement;  Surgeon: Jordan Lopez MD;  Location: [facility]     COLONOSCOPY  2002     COLONOSCOPY N/A 2017    Procedure: COLONOSCOPY;  Surgeon: Georges Kiser MD;  Location: [facility]     ESOPHAGOSCOPY, GASTROSCOPY, DUODENOSCOPY (EGD), COMBINED N/A 2017    Procedure: COMBINED ESOPHAGOSCOPY, GASTROSCOPY, DUODENOSCOPY (EGD), BIOPSY SINGLE OR MULTIPLE;  Surgeon: Georges Kiser MD;  Location: [facility]     SURGICAL HISTORY OF -       cyst removed from face     SURGICAL HISTORY OF -       imapacted wisdom teeth     SURGICAL HISTORY OF -       hammer toe     SURGICAL HISTORY OF -       metastalic melanoma     SURGICAL HISTORY OF -       left axillary node removal     SURGICAL HISTORY OF -       prostate cryoablation       Social History     Tobacco Use     Smoking status: Former Smoker     Last attempt to quit: 1988     Years since quittin.2     Smokeless tobacco: Former User     Tobacco comment:    Substance Use Topics     Alcohol use: Not Currently     Comment: 1 beers once a month     Family History   Problem Relation Age of Onset     Musculoskeletal Disorder Mother      Cancer Father      Diabetes Father      Prostate Cancer Father      Colon Cancer Father      Hypertension Maternal Grandmother      Heart Disease Maternal Grandfather      Heart Disease Paternal Grandmother      Heart Disease Paternal Grandfather      Diabetes Paternal Grandfather      Musculoskeletal Disorder Sister         foot problems     Unknown/Adopted Sister      Unknown/Adopted Brother      Diabetes Paternal  Half-Sister            Reviewed and updated as needed this visit by Provider         Review of Systems   ROS COMP: Constitutional, HEENT, cardiovascular, pulmonary, gi and gu systems are negative, except as otherwise noted.       Objective   Reported vitals:  There were no vitals taken for this visit.     Psych: Alert and oriented times 3; coherent speech, normal   rate and volume, able to articulate logical thoughts, able   to abstract reason, no tangential thoughts, no hallucinations   or delusions.     Diagnostic Test Results:  Labs reviewed in Epic        Assessment/Plan:  1. Type 2 diabetes mellitus without complication, without long-term current use of insulin (H)  Controlled, continue current meds  - **A1C FUTURE anytime; Future    2. Microalbuminuria  Needs recheck after adding Lisinopril in January  - **Basic metabolic panel FUTURE anytime; Future  - Albumin Random Urine Quantitative with Creat Ratio; Future    3. Hypertension goal BP (blood pressure) < 140/90  Controlled, needs recheck when social distancing guidelines are lifted.   - **Basic metabolic panel FUTURE anytime; Future    4. CARDIOVASCULAR SCREENING; LDL GOAL LESS THAN 130    - Lipid panel reflex to direct LDL Fasting; Future    5. Screening for prostate cancer    - **Prostate spec antigen screen FUTURE anytime; Future    No follow-ups on file.      Phone call duration:  15 minutes    DAGOBERTO Cruz CNP

## 2020-04-15 ENCOUNTER — TELEPHONE (OUTPATIENT)
Dept: FAMILY MEDICINE | Facility: CLINIC | Age: 70
End: 2020-04-15

## 2020-04-15 NOTE — TELEPHONE ENCOUNTER
Reason for call:  Patient reporting a symptom    Symptom or request: Pt did a virtual visit with PATRICK Brennan 4/9 and was told to call her back to report BP.  Pt had BP done yesterday and it was 120/72.  No need to call patient back, unless there are questions or problems.      Duration (how long have symptoms been present): ongoing    Have you been treated for this before? Yes    Additional comments:     Phone Number patient can be reached at:  Home number on file [phone] (home)    Best Time:  any    Can we leave a detailed message on this number:  YES    Call taken on 4/15/2020 at 12:41 PM by Sonia Arias

## 2020-06-29 ENCOUNTER — VIRTUAL VISIT (OUTPATIENT)
Dept: UROLOGY | Facility: CLINIC | Age: 70
End: 2020-06-29
Payer: COMMERCIAL

## 2020-06-29 DIAGNOSIS — N43.3 HYDROCELE IN ADULT: Primary | ICD-10-CM

## 2020-06-29 PROCEDURE — 99213 OFFICE O/P EST LOW 20 MIN: CPT | Mod: 95 | Performed by: UROLOGY

## 2020-06-29 NOTE — PROGRESS NOTES
Telephone visit:    Has a right hydrocele that is becoming increasingly symptomatic and voiding symptoms consisting of nocturia and daytime urgency despite tamsulosin.    Will have him come into clinic for drainage of his hydrocele and will talk to him about adding finasteride to his medicinal voiding regimen.    Total time 15 minutes.

## 2020-06-29 NOTE — Clinical Note
Please set him up for a clinic visit for percutaneous hydrocele drainage and bladder scan for follow up of his voiding symptoms in the next couple of weeks.

## 2020-07-14 ENCOUNTER — OFFICE VISIT (OUTPATIENT)
Dept: UROLOGY | Facility: CLINIC | Age: 70
End: 2020-07-14
Payer: COMMERCIAL

## 2020-07-14 VITALS
TEMPERATURE: 97.4 F | HEART RATE: 56 BPM | RESPIRATION RATE: 16 BRPM | SYSTOLIC BLOOD PRESSURE: 127 MMHG | DIASTOLIC BLOOD PRESSURE: 72 MMHG

## 2020-07-14 DIAGNOSIS — R33.9 INCOMPLETE BLADDER EMPTYING: Primary | ICD-10-CM

## 2020-07-14 DIAGNOSIS — N43.3 HYDROCELE IN ADULT: ICD-10-CM

## 2020-07-14 PROCEDURE — 51798 US URINE CAPACITY MEASURE: CPT | Performed by: UROLOGY

## 2020-07-14 PROCEDURE — 55000 DRAINAGE OF HYDROCELE: CPT | Mod: RT | Performed by: UROLOGY

## 2020-07-14 NOTE — PATIENT INSTRUCTIONS
Per physician instructions.    If you have questions or concerns on any instructions given to you by your provider today or if you need to schedule an appointment, you can reach us at [phone].    Thank you!

## 2020-07-14 NOTE — NURSING NOTE
Active order to obtain bladder scan? Yes   Name of ordering provider:  [facility]   Bladder scan preformed post void No, pt denied.  Bladder scan reveled 173ML  Provider notified?  Yes    Rae LYNN CMA

## 2020-07-14 NOTE — NURSING NOTE
"Chief Complaint   Patient presents with     Minor Procedure     Hydrocele      Nocturia     Urgency       Initial /72 (BP Location: Right arm, Patient Position: Chair, Cuff Size: Adult Regular)   Pulse 56   Temp 97.4  F (36.3  C) (Tympanic)   Resp 16  Estimated body mass index is 38.29 kg/m  as calculated from the following:    Height as of 1/30/20: 1.93 m (6' 4\").    Weight as of 1/30/20: 142.7 kg (314 lb 9.6 oz).  BP completed using cuff size: large   Medications and allergies reviewed.      Rae LYNN CMA     "

## 2020-08-07 ENCOUNTER — TELEPHONE (OUTPATIENT)
Dept: FAMILY MEDICINE | Facility: CLINIC | Age: 70
End: 2020-08-07

## 2020-08-07 DIAGNOSIS — E11.9 TYPE 2 DIABETES MELLITUS WITHOUT COMPLICATION, WITHOUT LONG-TERM CURRENT USE OF INSULIN (H): ICD-10-CM

## 2020-08-07 RX ORDER — METFORMIN HCL 500 MG
1000 TABLET, EXTENDED RELEASE 24 HR ORAL 2 TIMES DAILY WITH MEALS
Qty: 360 TABLET | Refills: 0 | Status: SHIPPED | OUTPATIENT
Start: 2020-08-07 | End: 2020-11-16

## 2020-08-07 NOTE — TELEPHONE ENCOUNTER
"Requested Prescriptions   Pending Prescriptions Disp Refills     metFORMIN (GLUCOPHAGE-XR) 500 MG 24 hr tablet [Pharmacy Med Name: metformin  mg tablet,extended release 24 hr] 180 tablet 3     Sig: Take 2 tablets (1,000 mg) by mouth 2 times daily (with meals)       Biguanide Agents Failed - 8/7/2020  8:01 AM        Failed - Patient has documented A1c within the specified period of time.     If HgbA1C is 8 or greater, it needs to be on file within the past 3 months.  If less than 8, must be on file within the past 6 months.     Recent Labs   Lab Test 01/30/20  1056   A1C 6.9*             Passed - Patient is age 10 or older        Passed - Patient's CR is NOT>1.4 OR Patient's EGFR is NOT<45 within past 12 mos.     Recent Labs   Lab Test 08/19/19  0938   GFRESTIMATED 89   GFRESTBLACK >90       Recent Labs   Lab Test 08/19/19  0938   CR 0.88             Passed - Patient does NOT have a diagnosis of CHF.        Passed - Medication is active on med list        Passed - Recent (6 mo) or future (30 days) visit within the authorizing provider's specialty     Patient had office visit in the last 6 months or has a visit in the next 30 days with authorizing provider or within the authorizing provider's specialty.  See \"Patient Info\" tab in inbasket, or \"Choose Columns\" in Meds & Orders section of the refill encounter.                 "

## 2020-08-09 DIAGNOSIS — Z13.6 CARDIOVASCULAR SCREENING; LDL GOAL LESS THAN 130: ICD-10-CM

## 2020-08-09 DIAGNOSIS — R80.9 MICROALBUMINURIA: ICD-10-CM

## 2020-08-09 DIAGNOSIS — E11.9 TYPE 2 DIABETES MELLITUS WITHOUT COMPLICATION, WITHOUT LONG-TERM CURRENT USE OF INSULIN (H): ICD-10-CM

## 2020-08-10 NOTE — TELEPHONE ENCOUNTER
"Requested Prescriptions   Pending Prescriptions Disp Refills     atorvastatin (LIPITOR) 40 MG tablet [Pharmacy Med Name: atorvastatin 40 mg tablet] 90 tablet 3     Sig: Take 1 tablet (40 mg) by mouth daily       Statins Protocol Passed - 8/9/2020  8:00 AM        Passed - LDL on file in past 12 months     Recent Labs   Lab Test 08/19/19  0938   LDL 36             Passed - No abnormal creatine kinase in past 12 months     No lab results found.             Passed - Recent (12 mo) or future (30 days) visit within the authorizing provider's specialty     Patient has had an office visit with the authorizing provider or a provider within the authorizing providers department within the previous 12 mos or has a future within next 30 days. See \"Patient Info\" tab in inbasket, or \"Choose Columns\" in Meds & Orders section of the refill encounter.              Passed - Medication is active on med list        Passed - Patient is age 18 or older             "

## 2020-08-10 NOTE — TELEPHONE ENCOUNTER
"Requested Prescriptions   Pending Prescriptions Disp Refills     lisinopril (ZESTRIL) 5 MG tablet [Pharmacy Med Name: lisinopril 5 mg tablet] 90 tablet 1     Sig: Take 1 tablet (5 mg) by mouth daily       ACE Inhibitors (Including Combos) Protocol Passed - 8/9/2020  8:00 AM        Passed - Blood pressure under 140/90 in past 12 months     BP Readings from Last 3 Encounters:   07/14/20 127/72   02/18/20 137/78   01/30/20 (!) 142/76                 Passed - Recent (12 mo) or future (30 days) visit within the authorizing provider's specialty     Patient has had an office visit with the authorizing provider or a provider within the authorizing providers department within the previous 12 mos or has a future within next 30 days. See \"Patient Info\" tab in inbasket, or \"Choose Columns\" in Meds & Orders section of the refill encounter.              Passed - Medication is active on med list        Passed - Patient is age 18 or older        Passed - Normal serum creatinine on file in past 12 months     Recent Labs   Lab Test 08/19/19  0938   CR 0.88       Ok to refill medication if creatinine is low          Passed - Normal serum potassium on file in past 12 months     Recent Labs   Lab Test 08/19/19  0938   POTASSIUM 4.4                  "

## 2020-08-11 RX ORDER — ATORVASTATIN CALCIUM 40 MG/1
40 TABLET, FILM COATED ORAL DAILY
Qty: 90 TABLET | Refills: 0 | Status: SHIPPED | OUTPATIENT
Start: 2020-08-11 | End: 2020-11-03

## 2020-08-11 RX ORDER — LISINOPRIL 5 MG/1
5 TABLET ORAL DAILY
Qty: 90 TABLET | Refills: 2 | Status: SHIPPED | OUTPATIENT
Start: 2020-08-11 | End: 2021-02-23

## 2020-08-20 ENCOUNTER — TELEPHONE (OUTPATIENT)
Dept: UROLOGY | Facility: CLINIC | Age: 70
End: 2020-08-20

## 2020-08-20 NOTE — TELEPHONE ENCOUNTER
I spoke to Angel today.  He has a Symptomatic right hydrocele. On 7-14-20 he had the Hydrocele drained of 100 ml's by Dr Nino. The plan was to do drainage and possible sclerotherapy should the swelling return.    Angel says that he is getting uncomfortable again. He is taking I-2 aspirin when needed. The swelling is about the same as when he came in on July 14th. He is looking for Dr Nino's recommendations.  Dr Nino how do you advise? Maribell LARA Rn

## 2020-08-20 NOTE — TELEPHONE ENCOUNTER
Left message for the pt requesting a return call, number given.    I made an appt for the pt on 08/31 @ 1570 to hold an appt spot.  Please verify this works for the pt.        Rae LYNN CMA

## 2020-08-20 NOTE — TELEPHONE ENCOUNTER
I spoke to Angel and let him know the date of the procedure. That day and time are just fine for him. I told him I will check to see if there is anything else that he needs for that day. Maribell LARA Rn

## 2020-08-20 NOTE — TELEPHONE ENCOUNTER
Reason for call:  Patient reporting a symptom    Symptom or request: Back in July had something done with hydrocele with right testicle.  Has been sore - uncomfortable, swelling up.    Duration (how long have symptoms been present): recently    Have you been treated for this before? No    Additional comments: Somedays gets very warm - this morning had a very bad backpain - asprin helps.  Sometimes taking more than 6 asprin a day to get through the discomfort    Phone Number patient can be reached at:  Home number on file [phone] (home)    Best Time:      Can we leave a detailed message on this number:  YES    Call taken on 8/20/2020 at 9:13 AM by Felicitas Rockwell

## 2020-08-20 NOTE — TELEPHONE ENCOUNTER
RACHELE Nino MD Hughes, Jody; Rae Perez CMA    Caller: Unspecified (Today,  9:13 AM)               Please schedule him for sclerotherapy of his hydrocele.

## 2020-08-21 NOTE — TELEPHONE ENCOUNTER
I spoke to Angel today and let him know that I spoke to the CMA that works with Dr Nino and there is no prep that he needs to do prior to his procedure on 8-31-20. Maribell LARA Rn

## 2020-08-24 DIAGNOSIS — R80.9 MICROALBUMINURIA: ICD-10-CM

## 2020-08-24 DIAGNOSIS — Z13.6 CARDIOVASCULAR SCREENING; LDL GOAL LESS THAN 130: ICD-10-CM

## 2020-08-24 DIAGNOSIS — I10 HYPERTENSION GOAL BP (BLOOD PRESSURE) < 140/90: ICD-10-CM

## 2020-08-24 DIAGNOSIS — E11.9 TYPE 2 DIABETES MELLITUS WITHOUT COMPLICATION, WITHOUT LONG-TERM CURRENT USE OF INSULIN (H): ICD-10-CM

## 2020-08-24 DIAGNOSIS — Z12.5 SCREENING FOR PROSTATE CANCER: ICD-10-CM

## 2020-08-24 LAB
ANION GAP SERPL CALCULATED.3IONS-SCNC: 5 MMOL/L (ref 3–14)
BUN SERPL-MCNC: 20 MG/DL (ref 7–30)
CALCIUM SERPL-MCNC: 9.4 MG/DL (ref 8.5–10.1)
CHLORIDE SERPL-SCNC: 104 MMOL/L (ref 94–109)
CHOLEST SERPL-MCNC: 94 MG/DL
CO2 SERPL-SCNC: 28 MMOL/L (ref 20–32)
CREAT SERPL-MCNC: 0.87 MG/DL (ref 0.66–1.25)
GFR SERPL CREATININE-BSD FRML MDRD: 88 ML/MIN/{1.73_M2}
GLUCOSE SERPL-MCNC: 140 MG/DL (ref 70–99)
HBA1C MFR BLD: 7.2 % (ref 0–5.6)
HDLC SERPL-MCNC: 35 MG/DL
LDLC SERPL CALC-MCNC: 46 MG/DL
NONHDLC SERPL-MCNC: 59 MG/DL
POTASSIUM SERPL-SCNC: 4.6 MMOL/L (ref 3.4–5.3)
PSA SERPL-ACNC: 0.42 UG/L (ref 0–4)
SODIUM SERPL-SCNC: 137 MMOL/L (ref 133–144)
TRIGL SERPL-MCNC: 67 MG/DL

## 2020-08-24 PROCEDURE — 83036 HEMOGLOBIN GLYCOSYLATED A1C: CPT | Performed by: NURSE PRACTITIONER

## 2020-08-24 PROCEDURE — 80061 LIPID PANEL: CPT | Performed by: NURSE PRACTITIONER

## 2020-08-24 PROCEDURE — 80048 BASIC METABOLIC PNL TOTAL CA: CPT | Performed by: NURSE PRACTITIONER

## 2020-08-24 PROCEDURE — G0103 PSA SCREENING: HCPCS | Performed by: NURSE PRACTITIONER

## 2020-08-24 PROCEDURE — 36415 COLL VENOUS BLD VENIPUNCTURE: CPT | Performed by: NURSE PRACTITIONER

## 2020-08-24 NOTE — LETTER
August 25, 2020      Angel Hill  [address]   [place] [address]        Dear ,    We are writing to inform you of your test results.    Harsahl Ortiz, Cholesterol levels are at goal. The kidney function and electrolytes are good. Prostate screening is normal. The blood sugar and A1c are elevated. I would like to see your A1c < 7. I would suggest to add on a second low dose agent to help lower that number as you are on the highest dose of Metformin. Please let me know if that is acceptable to you and I can send it to the pharmacy or you can come into the clinic to discuss further. Please let us know if you have any questions.     Resulted Orders   **A1C FUTURE anytime   Result Value Ref Range    Hemoglobin A1C 7.2 (H) 0 - 5.6 %      Comment:      Normal <5.7% Prediabetes 5.7-6.4%  Diabetes 6.5% or higher - adopted from ADA   consensus guidelines.     **Basic metabolic panel FUTURE anytime   Result Value Ref Range    Sodium 137 133 - 144 mmol/L    Potassium 4.6 3.4 - 5.3 mmol/L    Chloride 104 94 - 109 mmol/L    Carbon Dioxide 28 20 - 32 mmol/L    Anion Gap 5 3 - 14 mmol/L    Glucose 140 (H) 70 - 99 mg/dL      Comment:      Fasting specimen    Urea Nitrogen 20 7 - 30 mg/dL    Creatinine 0.87 0.66 - 1.25 mg/dL    GFR Estimate 88 >60 mL/min/[1.73_m2]      Comment:      Non  GFR Calc  Starting 12/18/2018, serum creatinine based estimated GFR (eGFR) will be   calculated using the Chronic Kidney Disease Epidemiology Collaboration   (CKD-EPI) equation.      GFR Estimate If Black >90 >60 mL/min/[1.73_m2]      Comment:       GFR Calc  Starting 12/18/2018, serum creatinine based estimated GFR (eGFR) will be   calculated using the Chronic Kidney Disease Epidemiology Collaboration   (CKD-EPI) equation.      Calcium 9.4 8.5 - 10.1 mg/dL   Lipid panel reflex to direct LDL Fasting   Result Value Ref Range    Cholesterol 94 <200 mg/dL    Triglycerides 67 <150 mg/dL      Comment:      Fasting  specimen    HDL Cholesterol 35 (L) >39 mg/dL    LDL Cholesterol Calculated 46 <100 mg/dL      Comment:      Desirable:       <100 mg/dl    Non HDL Cholesterol 59 <130 mg/dL   **Prostate spec antigen screen FUTURE anytime   Result Value Ref Range    PSA 0.42 0 - 4 ug/L      Comment:      Assay Method:  Chemiluminescence using Siemens Vista analyzer       If you have any questions or concerns, please call the clinic at the number listed above.       Sincerely,        [facility]

## 2020-08-25 NOTE — RESULT ENCOUNTER NOTE
Please send patient letter notifying of labs and provider message.    Harshal Ortiz, Cholesterol levels are at goal. The kidney function and electrolytes are good. Prostate screening is normal. The blood sugar and A1c are elevated. I would like to see your A1c < 7. I would suggest to add on a second low dose agent to help lower that number as you are on the highest dose of Metformin. Please let me know if that is acceptable to you and I can send it to the pharmacy or you can come into the clinic to discuss further. Please let us know if you have any questions.      Thanks,  DAGOBERTO Nazario CNP

## 2020-08-31 ENCOUNTER — OFFICE VISIT (OUTPATIENT)
Dept: UROLOGY | Facility: CLINIC | Age: 70
End: 2020-08-31
Payer: COMMERCIAL

## 2020-08-31 VITALS
HEART RATE: 57 BPM | RESPIRATION RATE: 18 BRPM | SYSTOLIC BLOOD PRESSURE: 138 MMHG | TEMPERATURE: 97.3 F | DIASTOLIC BLOOD PRESSURE: 70 MMHG

## 2020-08-31 DIAGNOSIS — N43.3 HYDROCELE IN ADULT: Primary | ICD-10-CM

## 2020-08-31 PROCEDURE — 55000 DRAINAGE OF HYDROCELE: CPT | Mod: RT | Performed by: UROLOGY

## 2020-08-31 NOTE — NURSING NOTE
"Chief Complaint   Patient presents with     Minor Procedure     Hydrocele aspiration with sclerotherapy        Initial /70 (BP Location: Right arm, Patient Position: Chair, Cuff Size: Adult Large)   Pulse 57   Temp 97.3  F (36.3  C) (Tympanic)   Resp 18  Estimated body mass index is 38.29 kg/m  as calculated from the following:    Height as of 1/30/20: 1.93 m (6' 4\").    Weight as of 1/30/20: 142.7 kg (314 lb 9.6 oz).  BP completed using cuff size: large   Medications and allergies reviewed.      Rae LYNN CMA     "

## 2020-08-31 NOTE — PROGRESS NOTES
Appointment source: Established Patient  [facility]  [facility] Staff: Alex Nino MD    Subjective: This is a 70 year old year old male returning for follow up of recurrent right hydrocele.    The hydrocele is again symptomatic and he is requesting sclerotherapy.    Objective: Unilateral right spermatic cord block was achieved using 20 cc of 0.5% bupivacaine.    An Angiocath was then inserted into the hydrocele sac and the sac was drained of approximately 150 cc of clear yellow fluid.    This was followed by the injection of 300 mg of doxycycline diluted in 10 cc of 0.5% bupivacaine.    He noted slight discomfort develop in the scrotum but the level of discomfort was manageable and did not require any additional injection of analgesic.    Assessment: Recurrent right hydrocele needed with sclerotherapy.    Plan: Return PRN

## 2020-08-31 NOTE — PATIENT INSTRUCTIONS
Per physician instructions.    If you have questions or concerns on any instructions given to you by your provider today or if you need to schedule an appointment, you can reach us at [phone].    Thank you!

## 2020-09-11 DIAGNOSIS — I10 HYPERTENSION GOAL BP (BLOOD PRESSURE) < 140/90: ICD-10-CM

## 2020-09-11 RX ORDER — METOPROLOL SUCCINATE 200 MG/1
200 TABLET, EXTENDED RELEASE ORAL DAILY
Qty: 90 TABLET | Refills: 2 | Status: SHIPPED | OUTPATIENT
Start: 2020-09-11 | End: 2021-02-23

## 2020-09-11 NOTE — TELEPHONE ENCOUNTER
"Requested Prescriptions   Pending Prescriptions Disp Refills     metoprolol succinate ER (TOPROL-XL) 200 MG 24 hr tablet [Pharmacy Med Name: metoprolol succinate  mg tablet,extended release 24 hr] 90 tablet 3     Sig: Take 1 tablet (200 mg) by mouth daily       Beta-Blockers Protocol Passed - 9/11/2020  8:01 AM        Passed - Blood pressure under 140/90 in past 12 months     BP Readings from Last 3 Encounters:   08/31/20 138/70   07/14/20 127/72   02/18/20 137/78                 Passed - Patient is age 6 or older        Passed - Recent (12 mo) or future (30 days) visit within the authorizing provider's specialty     Patient has had an office visit with the authorizing provider or a provider within the authorizing providers department within the previous 12 mos or has a future within next 30 days. See \"Patient Info\" tab in inbasket, or \"Choose Columns\" in Meds & Orders section of the refill encounter.              Passed - Medication is active on med list             "

## 2020-10-31 DIAGNOSIS — Z13.6 CARDIOVASCULAR SCREENING; LDL GOAL LESS THAN 130: ICD-10-CM

## 2020-11-02 NOTE — TELEPHONE ENCOUNTER
"Requested Prescriptions   Pending Prescriptions Disp Refills     atorvastatin (LIPITOR) 40 MG tablet [Pharmacy Med Name: atorvastatin 40 mg tablet] 90 tablet 0     Sig: Take 1 tablet (40 mg) by mouth daily       Statins Protocol Passed - 10/31/2020  8:01 AM        Passed - LDL on file in past 12 months     Recent Labs   Lab Test 08/24/20  0704   LDL 46             Passed - No abnormal creatine kinase in past 12 months     No lab results found.             Passed - Recent (12 mo) or future (30 days) visit within the authorizing provider's specialty     Patient has had an office visit with the authorizing provider or a provider within the authorizing providers department within the previous 12 mos or has a future within next 30 days. See \"Patient Info\" tab in inbasket, or \"Choose Columns\" in Meds & Orders section of the refill encounter.              Passed - Medication is active on med list        Passed - Patient is age 18 or older             "

## 2020-11-03 RX ORDER — ATORVASTATIN CALCIUM 40 MG/1
40 TABLET, FILM COATED ORAL DAILY
Qty: 90 TABLET | Refills: 1 | Status: SHIPPED | OUTPATIENT
Start: 2020-11-03 | End: 2021-02-23

## 2020-11-16 DIAGNOSIS — E11.9 TYPE 2 DIABETES MELLITUS WITHOUT COMPLICATION, WITHOUT LONG-TERM CURRENT USE OF INSULIN (H): ICD-10-CM

## 2020-11-16 RX ORDER — METFORMIN HCL 500 MG
1000 TABLET, EXTENDED RELEASE 24 HR ORAL 2 TIMES DAILY WITH MEALS
Qty: 360 TABLET | Refills: 0 | Status: SHIPPED | OUTPATIENT
Start: 2020-11-16 | End: 2021-02-19

## 2020-12-29 ENCOUNTER — NURSE TRIAGE (OUTPATIENT)
Dept: NURSING | Facility: CLINIC | Age: 70
End: 2020-12-29

## 2020-12-30 ENCOUNTER — OFFICE VISIT (OUTPATIENT)
Dept: FAMILY MEDICINE | Facility: CLINIC | Age: 70
End: 2020-12-30
Payer: COMMERCIAL

## 2020-12-30 ENCOUNTER — ANCILLARY PROCEDURE (OUTPATIENT)
Dept: GENERAL RADIOLOGY | Facility: CLINIC | Age: 70
End: 2020-12-30
Attending: NURSE PRACTITIONER
Payer: COMMERCIAL

## 2020-12-30 VITALS
DIASTOLIC BLOOD PRESSURE: 68 MMHG | HEART RATE: 60 BPM | HEIGHT: 76 IN | RESPIRATION RATE: 16 BRPM | BODY MASS INDEX: 38.36 KG/M2 | TEMPERATURE: 97.6 F | WEIGHT: 315 LBS | OXYGEN SATURATION: 98 % | SYSTOLIC BLOOD PRESSURE: 134 MMHG

## 2020-12-30 DIAGNOSIS — M54.6 ACUTE LEFT-SIDED THORACIC BACK PAIN: ICD-10-CM

## 2020-12-30 DIAGNOSIS — R06.2 WHEEZING: ICD-10-CM

## 2020-12-30 DIAGNOSIS — R05.9 COUGH: ICD-10-CM

## 2020-12-30 DIAGNOSIS — M54.6 ACUTE LEFT-SIDED THORACIC BACK PAIN: Primary | ICD-10-CM

## 2020-12-30 DIAGNOSIS — R06.02 SHORTNESS OF BREATH: ICD-10-CM

## 2020-12-30 DIAGNOSIS — R07.89 OTHER CHEST PAIN: ICD-10-CM

## 2020-12-30 LAB
ALBUMIN SERPL-MCNC: 3.8 G/DL (ref 3.4–5)
ALP SERPL-CCNC: 87 U/L (ref 40–150)
ALT SERPL W P-5'-P-CCNC: 26 U/L (ref 0–70)
ANION GAP SERPL CALCULATED.3IONS-SCNC: <1 MMOL/L (ref 3–14)
AST SERPL W P-5'-P-CCNC: 16 U/L (ref 0–45)
BASOPHILS # BLD AUTO: 0 10E9/L (ref 0–0.2)
BASOPHILS NFR BLD AUTO: 0.3 %
BILIRUB SERPL-MCNC: 0.5 MG/DL (ref 0.2–1.3)
BUN SERPL-MCNC: 21 MG/DL (ref 7–30)
CALCIUM SERPL-MCNC: 9.5 MG/DL (ref 8.5–10.1)
CHLORIDE SERPL-SCNC: 104 MMOL/L (ref 94–109)
CO2 SERPL-SCNC: 31 MMOL/L (ref 20–32)
CREAT SERPL-MCNC: 0.87 MG/DL (ref 0.66–1.25)
D DIMER PPP FEU-MCNC: 0.4 UG/ML FEU (ref 0–0.5)
DIFFERENTIAL METHOD BLD: NORMAL
EOSINOPHIL # BLD AUTO: 0.3 10E9/L (ref 0–0.7)
EOSINOPHIL NFR BLD AUTO: 3.9 %
ERYTHROCYTE [DISTWIDTH] IN BLOOD BY AUTOMATED COUNT: 13.2 % (ref 10–15)
GFR SERPL CREATININE-BSD FRML MDRD: 88 ML/MIN/{1.73_M2}
GLUCOSE SERPL-MCNC: 167 MG/DL (ref 70–99)
HCT VFR BLD AUTO: 42 % (ref 40–53)
HGB BLD-MCNC: 13.7 G/DL (ref 13.3–17.7)
LYMPHOCYTES # BLD AUTO: 2 10E9/L (ref 0.8–5.3)
LYMPHOCYTES NFR BLD AUTO: 29.1 %
MCH RBC QN AUTO: 29 PG (ref 26.5–33)
MCHC RBC AUTO-ENTMCNC: 32.6 G/DL (ref 31.5–36.5)
MCV RBC AUTO: 89 FL (ref 78–100)
MONOCYTES # BLD AUTO: 0.6 10E9/L (ref 0–1.3)
MONOCYTES NFR BLD AUTO: 8.3 %
NEUTROPHILS # BLD AUTO: 4.1 10E9/L (ref 1.6–8.3)
NEUTROPHILS NFR BLD AUTO: 58.4 %
PLATELET # BLD AUTO: 275 10E9/L (ref 150–450)
POTASSIUM SERPL-SCNC: 4.6 MMOL/L (ref 3.4–5.3)
PROT SERPL-MCNC: 7.3 G/DL (ref 6.8–8.8)
RBC # BLD AUTO: 4.73 10E12/L (ref 4.4–5.9)
SODIUM SERPL-SCNC: 135 MMOL/L (ref 133–144)
WBC # BLD AUTO: 7 10E9/L (ref 4–11)

## 2020-12-30 PROCEDURE — 80053 COMPREHEN METABOLIC PANEL: CPT | Performed by: NURSE PRACTITIONER

## 2020-12-30 PROCEDURE — 99214 OFFICE O/P EST MOD 30 MIN: CPT | Performed by: NURSE PRACTITIONER

## 2020-12-30 PROCEDURE — 71046 X-RAY EXAM CHEST 2 VIEWS: CPT | Mod: FY | Performed by: RADIOLOGY

## 2020-12-30 PROCEDURE — 85379 FIBRIN DEGRADATION QUANT: CPT | Performed by: NURSE PRACTITIONER

## 2020-12-30 PROCEDURE — 85025 COMPLETE CBC W/AUTO DIFF WBC: CPT | Performed by: NURSE PRACTITIONER

## 2020-12-30 PROCEDURE — 36415 COLL VENOUS BLD VENIPUNCTURE: CPT | Performed by: NURSE PRACTITIONER

## 2020-12-30 RX ORDER — ALBUTEROL SULFATE 90 UG/1
2 AEROSOL, METERED RESPIRATORY (INHALATION) EVERY 6 HOURS
Qty: 8.5 G | Refills: 1 | Status: SHIPPED | OUTPATIENT
Start: 2020-12-30

## 2020-12-30 ASSESSMENT — ENCOUNTER SYMPTOMS
COUGH: 1
VOMITING: 0
SINUS PRESSURE: 1
APPETITE CHANGE: 0
DIARRHEA: 0
CHILLS: 0
RHINORRHEA: 0
NAUSEA: 0
MYALGIAS: 0
SHORTNESS OF BREATH: 1
FATIGUE: 0
WHEEZING: 1
FEVER: 0
HEADACHES: 0
SORE THROAT: 0

## 2020-12-30 ASSESSMENT — MIFFLIN-ST. JEOR: SCORE: 2300.33

## 2020-12-30 NOTE — TELEPHONE ENCOUNTER
Triage Call:  Pt is calling in regards to having posterior L sided lung pain when he is laying down. Pt stated he has noticed it for the last 2-3 weeks. Productive cough, off white phlegm. Pt states he will get coughing spells to the point of vomiting. Denied chest pain, denied sob, no wheezing. Pt is unable to take his temperature as he does not have a thermometer.     Paging Dr Farzana Garland @ 6:50pm   Provider recommendation, if the pt is comfortable waiting tonight, does not recommend ER but needs to be evaluated in clinic in the next 24 hours. If he is really concerned or sob/difficulty breathing go to the ER for evaluation. Otherwise, an in office appointment for tomorrow. Recommended that pt schedule an appointment.     Patient was called back an informed of Dr Garland recommendations. Pt states understanding. Pt was connected with a  for an appointment tomorrow. Pt understands that if his condition worsens to go to the ER.     COVID 19 Nurse Triage Plan/Patient Instructions    Please be aware that novel coronavirus (COVID-19) may be circulating in the community. If you develop symptoms such as fever, cough, or SOB or if you have concerns about the presence of another infection including coronavirus (COVID-19), please contact your health care provider or visit [email or web address].     Disposition/Instructions    In-Person Visit with provider recommended. Reference Visit Selection Guide.    Thank you for taking steps to prevent the spread of this virus.  o Limit your contact with others.  o Wear a simple mask to cover your cough.  o Wash your hands well and often.    Resources    [facility]: About COVID-19: [email or web address]    CDC: What to Do If You're Sick: www.cdc.gov/coronavirus/2019-ncov/about/steps-when-sick.html    CDC: Ending Home Isolation: www.cdc.gov/coronavirus/2019-ncov/hcp/disposition-in-home-patients.html     CDC: Caring for Someone:  www.cdc.gov/coronavirus/2019-ncov/if-you-are-sick/care-for-someone.html     [facility]: Interim Guidance for Hospital Discharge to Home: www.health.[facility].[place]/diseases/coronavirus/hcp/hospdischarge.pdf    [facility] clinical trials (COVID-19 research studies): clinicalaffairs.[facility]-clinical-trials     Below are the COVID-19 hotlines at the [place] Department of Health ([facility]). Interpreters are available.   o For health questions: Call 572-113-3577 or 1-321.169.6119 (7 a.m. to 7 p.m.)  o For questions about schools and childcare: Call 165-070-9647 or 1-439.733.6653 (7 a.m. to 7 p.m.)       Barbara Rodriguez RN Nursing Advisor 12/29/2020 7:17 PM

## 2020-12-30 NOTE — PROGRESS NOTES
"Subjective     Angel Hill is a 70 year old male who presents to clinic today for the following health issues:    HPI         Acute Illness  Acute illness concerns: x 2-3 weeks L side lung pain when laying down   Onset/Duration: 2-3 weeks  Symptoms:  Fever: no  Chills/Sweats: no  Headache (location?): no - some times  Sinus Pressure: YES  Conjunctivitis:  no  Ear Pain: no  Rhinorrhea: no  Congestion: YES  Sore Throat: no  Cough: YES-non-productive  Wheeze: YES  Decreased Appetite: no  Nausea: no  Vomiting: no  Diarrhea: no  Dysuria/Freq.: no  Dysuria or Hematuria: no  Fatigue/Achiness: no  Sick/Strep Exposure: no  Therapies tried and outcome: None    Additional provider notes: 2-3 weeks of cough, wheezing and left sided lung pain when laying down. Denies pain when up and around during the day. Reports chronic cough/lung issues his \"whole life\". States sometimes when it is damp outside he can feel it in his left lung. Former smoker.  by trade, has been exposed to chemicals via work. Wheezing and shortness of breath are only occasional.     Family history of asthma.     Review of Systems   Constitutional: Negative for appetite change, chills, fatigue and fever.   HENT: Positive for congestion and sinus pressure. Negative for ear pain, rhinorrhea and sore throat.    Respiratory: Positive for cough, shortness of breath (occasionally) and wheezing.    Gastrointestinal: Negative for diarrhea, nausea and vomiting.   Genitourinary: Negative.    Musculoskeletal: Negative for myalgias.   Neurological: Negative for headaches (occasionally).            Objective    /68 (BP Location: Right arm, Patient Position: Sitting, Cuff Size: Adult Large)   Pulse 60   Temp 97.6  F (36.4  C) (Tympanic)   Resp 16   Ht 1.93 m (6' 4\")   Wt 142.9 kg (315 lb)   SpO2 98%   BMI 38.34 kg/m    Body mass index is 38.34 kg/m .  Physical Exam  Vitals signs and nursing note reviewed.   Constitutional:       General: He is not in " acute distress.     Appearance: Normal appearance. He is not ill-appearing or toxic-appearing.   HENT:      Head: Normocephalic and atraumatic.      Right Ear: Tympanic membrane, ear canal and external ear normal.      Left Ear: Tympanic membrane, ear canal and external ear normal.      Nose: No rhinorrhea.      Mouth/Throat:      Pharynx: No posterior oropharyngeal erythema.   Cardiovascular:      Rate and Rhythm: Normal rate and regular rhythm.      Pulses: Normal pulses.      Heart sounds: Normal heart sounds.   Pulmonary:      Effort: Pulmonary effort is normal.      Breath sounds: Normal breath sounds.   Skin:     General: Skin is warm and dry.   Neurological:      Mental Status: He is alert and oriented to person, place, and time.   Psychiatric:         Behavior: Behavior normal.            Results for orders placed or performed in visit on 12/30/20 (from the past 24 hour(s))   CBC with platelets and differential   Result Value Ref Range    WBC 7.0 4.0 - 11.0 10e9/L    RBC Count 4.73 4.4 - 5.9 10e12/L    Hemoglobin 13.7 13.3 - 17.7 g/dL    Hematocrit 42.0 40.0 - 53.0 %    MCV 89 78 - 100 fl    MCH 29.0 26.5 - 33.0 pg    MCHC 32.6 31.5 - 36.5 g/dL    RDW 13.2 10.0 - 15.0 %    Platelet Count 275 150 - 450 10e9/L    % Neutrophils 58.4 %    % Lymphocytes 29.1 %    % Monocytes 8.3 %    % Eosinophils 3.9 %    % Basophils 0.3 %    Absolute Neutrophil 4.1 1.6 - 8.3 10e9/L    Absolute Lymphocytes 2.0 0.8 - 5.3 10e9/L    Absolute Monocytes 0.6 0.0 - 1.3 10e9/L    Absolute Eosinophils 0.3 0.0 - 0.7 10e9/L    Absolute Basophils 0.0 0.0 - 0.2 10e9/L    Diff Method Automated Method            Assessment & Plan     Acute left-sided thoracic back pain  No acute processes noted upon initial provider review. Will notify patient when official read from radiology comes through. D-dimer ordered to r/o PE.     - XR Chest 2 Views; Future  - D dimer, quantitative    Other chest pain    - D dimer, quantitative    Cough  Labs ordered.  "Albuterol prescribed to help with cough, SOB, wheezing. Side effects, risks and benefits of medication were discussed with patient. Discussed how and when to take medication. Follow-up if symptoms do not improve.    - XR Chest 2 Views; Future  - CBC with platelets and differential  - Comprehensive metabolic panel (BMP + Alb, Alk Phos, ALT, AST, Total. Bili, TP)  - albuterol (PROAIR HFA/PROVENTIL HFA/VENTOLIN HFA) 108 (90 Base) MCG/ACT inhaler; Inhale 2 puffs into the lungs every 6 hours    Shortness of breath    - D dimer, quantitative  - albuterol (PROAIR HFA/PROVENTIL HFA/VENTOLIN HFA) 108 (90 Base) MCG/ACT inhaler; Inhale 2 puffs into the lungs every 6 hours    Wheezing    - albuterol (PROAIR HFA/PROVENTIL HFA/VENTOLIN HFA) 108 (90 Base) MCG/ACT inhaler; Inhale 2 puffs into the lungs every 6 hours     BMI:   Estimated body mass index is 38.34 kg/m  as calculated from the following:    Height as of this encounter: 1.93 m (6' 4\").    Weight as of this encounter: 142.9 kg (315 lb).            Patient Instructions   Left lung pain:  1. X-ray was negative for any acute fractures. We will notify you when radiology does the official read.  2. Use inhaler as needed for cough, shortness of breath, wheezing.   3. Lab work ordered today.   4. Follow-up in 1-2 weeks if symptoms do not improve or sooner if symptoms worsen.        Return if symptoms worsen or fail to improve.    DAGOBERTO [facility]      "

## 2020-12-30 NOTE — TELEPHONE ENCOUNTER
Additional Information    Negative: Difficulty breathing    Negative: Patient sounds very sick or weak to the triager    Negative: [1] Coughed up blood AND [2] > 1 tablespoon (15 ml) (Exception: blood-tinged sputum)    Negative: Fever > 103 F (39.4 C)    Negative: [1] Fever > 101 F (38.3 C) AND [2] age > 60    Negative: [1] Fever > 100.0 F (37.8 C) AND [2] bedridden (e.g., nursing home patient, CVA, chronic illness, recovering from surgery)    Negative: [1] Fever > 100.0 F (37.8 C) AND [2] diabetes mellitus or weak immune system (e.g., HIV positive, cancer chemo, splenectomy, organ transplant, chronic steroids)    Negative: Wheezing is present    SEVERE coughing spells (e.g., whooping sound after coughing, vomiting after coughing)    Protocols used: COUGH - ACUTE [id number]

## 2020-12-30 NOTE — PATIENT INSTRUCTIONS
Left lung pain:  1. X-ray was negative for any acute fractures. We will notify you when radiology does the official read.  2. Use inhaler as needed for cough, shortness of breath, wheezing.   3. Lab work ordered today.   4. Follow-up in 1-2 weeks if symptoms do not improve or sooner if symptoms worsen.

## 2020-12-31 ENCOUNTER — TELEPHONE (OUTPATIENT)
Dept: FAMILY MEDICINE | Facility: CLINIC | Age: 70
End: 2020-12-31

## 2020-12-31 NOTE — TELEPHONE ENCOUNTER
Discussed lab results with patient. Elevated glucose. Recommend follow-up with PCP over the next month. Will likely need A1C checked (last was 08/20). Recommended mucinex for phlegm and Tylenol/ibuprofen for pain.     Saray Yeung DNP, NP-C 12/31/2020 4:16 PM

## 2021-02-18 DIAGNOSIS — E11.9 TYPE 2 DIABETES MELLITUS WITHOUT COMPLICATION, WITHOUT LONG-TERM CURRENT USE OF INSULIN (H): ICD-10-CM

## 2021-02-18 NOTE — TELEPHONE ENCOUNTER
metformin  mg tablet,extended release 24 hr  Last Written Prescription Date:  11/16/2020  Last Fill Quantity: 360,  # refills: 0   Last office visit: 12/30/2020 with prescribing provider:  anya   Future Office Visit:   Next 5 appointments (look out 90 days)    Feb 23, 2021  1:40 PM  SHORT with Ric Umanzor MD  [facility] ([facility] ) [id number] [facility] [address]  [phone]

## 2021-02-19 RX ORDER — METFORMIN HCL 500 MG
1000 TABLET, EXTENDED RELEASE 24 HR ORAL 2 TIMES DAILY WITH MEALS
Qty: 28 TABLET | Refills: 0 | Status: SHIPPED | OUTPATIENT
Start: 2021-02-19 | End: 2021-02-23

## 2021-02-19 NOTE — TELEPHONE ENCOUNTER
Patient last A1C lab results on 8/24/20. It was recommended that he start an additional medication to go along with metformin. He has not started any other medications. He has 9 pills left of his metformin.  Will send a short fill for 1 week to get him through until his appointment on 2/23/21. Patient understands and is ok with short fill.  Nina Crews RN

## 2021-02-23 ENCOUNTER — OFFICE VISIT (OUTPATIENT)
Dept: FAMILY MEDICINE | Facility: CLINIC | Age: 71
End: 2021-02-23
Payer: COMMERCIAL

## 2021-02-23 VITALS
WEIGHT: 315 LBS | RESPIRATION RATE: 18 BRPM | TEMPERATURE: 97 F | HEART RATE: 51 BPM | SYSTOLIC BLOOD PRESSURE: 142 MMHG | HEIGHT: 76 IN | BODY MASS INDEX: 38.36 KG/M2 | OXYGEN SATURATION: 98 % | DIASTOLIC BLOOD PRESSURE: 78 MMHG

## 2021-02-23 DIAGNOSIS — E11.9 TYPE 2 DIABETES MELLITUS WITHOUT COMPLICATION, WITHOUT LONG-TERM CURRENT USE OF INSULIN (H): ICD-10-CM

## 2021-02-23 DIAGNOSIS — R80.9 MICROALBUMINURIA: ICD-10-CM

## 2021-02-23 DIAGNOSIS — Z13.6 CARDIOVASCULAR SCREENING; LDL GOAL LESS THAN 130: ICD-10-CM

## 2021-02-23 DIAGNOSIS — N32.0 BLADDER OUTLET OBSTRUCTION: ICD-10-CM

## 2021-02-23 DIAGNOSIS — I10 HYPERTENSION GOAL BP (BLOOD PRESSURE) < 140/90: ICD-10-CM

## 2021-02-23 LAB — HBA1C MFR BLD: 7.2 % (ref 0–5.6)

## 2021-02-23 PROCEDURE — 36415 COLL VENOUS BLD VENIPUNCTURE: CPT | Performed by: FAMILY MEDICINE

## 2021-02-23 PROCEDURE — 83036 HEMOGLOBIN GLYCOSYLATED A1C: CPT | Performed by: FAMILY MEDICINE

## 2021-02-23 PROCEDURE — 99214 OFFICE O/P EST MOD 30 MIN: CPT | Performed by: FAMILY MEDICINE

## 2021-02-23 RX ORDER — ATORVASTATIN CALCIUM 40 MG/1
40 TABLET, FILM COATED ORAL DAILY
Qty: 90 TABLET | Refills: 3 | Status: SHIPPED | OUTPATIENT
Start: 2021-02-23

## 2021-02-23 RX ORDER — METFORMIN HCL 500 MG
1000 TABLET, EXTENDED RELEASE 24 HR ORAL 2 TIMES DAILY WITH MEALS
Qty: 360 TABLET | Refills: 3 | Status: SHIPPED | OUTPATIENT
Start: 2021-02-23

## 2021-02-23 RX ORDER — TAMSULOSIN HYDROCHLORIDE 0.4 MG/1
0.4 CAPSULE ORAL DAILY
Qty: 90 CAPSULE | Refills: 3 | Status: SHIPPED | OUTPATIENT
Start: 2021-02-23

## 2021-02-23 RX ORDER — METOPROLOL SUCCINATE 200 MG/1
200 TABLET, EXTENDED RELEASE ORAL DAILY
Qty: 90 TABLET | Refills: 3 | Status: SHIPPED | OUTPATIENT
Start: 2021-02-23

## 2021-02-23 RX ORDER — LISINOPRIL 5 MG/1
5 TABLET ORAL DAILY
Qty: 90 TABLET | Refills: 3 | Status: SHIPPED | OUTPATIENT
Start: 2021-02-23

## 2021-02-23 ASSESSMENT — MIFFLIN-ST. JEOR: SCORE: 2316.66

## 2021-02-23 NOTE — LETTER
February 24, 2021      Angel Hill  [address]   [facility] [address]        Dear Sergio,    We are writing to inform you of your test results.    tests are acceptable    Resulted Orders   Hemoglobin A1c   Result Value Ref Range    Hemoglobin A1C 7.2 (H) 0 - 5.6 %      Comment:      Normal <5.7% Prediabetes 5.7-6.4%  Diabetes 6.5% or higher - adopted from ADA   consensus guidelines.         If you have any questions or concerns, please call the clinic at the number listed above.       Sincerely,      Ric Umanzor MD

## 2021-02-23 NOTE — PROGRESS NOTES
Sunni Ortiz is a 70 year old who presents for the following health issues     HPI       Diabetes Follow-up    How often are you checking your blood sugar? A few times a week  What time of day are you checking your blood sugars (select all that apply)?  Before meals and After meals  Have you had any blood sugars above 200?  No  Have you had any blood sugars below 70?  No    What symptoms do you notice when your blood sugar is low?  None    What concerns do you have today about your diabetes? Other: food     Do you have any of these symptoms? (Select all that apply)  Numbness in feet    Have you had a diabetic eye exam in the last 12 months? No          Hyperlipidemia Follow-Up      Are you regularly taking any medication or supplement to lower your cholesterol?   Yes- atorvastatin (LIPITOR) 40 MG tablet    Are you having muscle aches or other side effects that you think could be caused by your cholesterol lowering medication?  No    Hypertension Follow-up      Do you check your blood pressure regularly outside of the clinic? No     Are you following a low salt diet? No    Are your blood pressures ever more than 140 on the top number (systolic) OR more   than 90 on the bottom number (diastolic), for example 140/90? unknown     BP Readings from Last 2 Encounters:   12/30/20 134/68   08/31/20 138/70     Hemoglobin A1C (%)   Date Value   08/24/2020 7.2 (H)   01/30/2020 6.9 (H)     LDL Cholesterol Calculated (mg/dL)   Date Value   08/24/2020 46   08/19/2019 36         How many servings of fruits and vegetables do you eat daily?  0-1    On average, how many sweetened beverages do you drink each day (Examples: soda, juice, sweet tea, etc.  Do NOT count diet or artificially sweetened beverages)?   0    How many days per week do you exercise enough to make your heart beat faster? 5    How many minutes a day do you exercise enough to make your heart beat faster? 10 - 19  How many days per week do you miss taking  your medication? 1    What makes it hard for you to take your medications?  remembering to take          Review of Systems   Constitutional, HEENT, cardiovascular, pulmonary, GI, , musculoskeletal, neuro, skin, endocrine and psych systems are negative, except as otherwise noted.      Objective    There were no vitals taken for this visit.  There is no height or weight on file to calculate BMI.  Physical Exam   GENERAL: healthy, alert and no distress  EYES: Eyes grossly normal to inspection, PERRL and conjunctivae and sclerae normal  HENT: ear canals and TM's normal, nose and mouth without ulcers or lesions  NECK: no adenopathy, no asymmetry, masses, or scars and thyroid normal to palpation  RESP: lungs clear to auscultation - no rales, rhonchi or wheezes  CV: regular rate and rhythm, normal S1 S2, no S3 or S4, no murmur, click or rub, no peripheral edema and peripheral pulses strong  ABDOMEN: soft, nontender, no hepatosplenomegaly, no masses and bowel sounds normal  MS: no gross musculoskeletal defects noted, no edema  SKIN: no suspicious lesions or rashes  NEURO: Normal strength and tone, mentation intact and speech normal  PSYCH: mentation appears normal, affect normal/bright        ASSESSMENT:     Type 2 diabetes mellitus without complication, without long-term current use of insulin (H)  Microalbuminuria  Hypertension goal BP (blood pressure) < 140/90  Bladder outlet obstruction  CARDIOVASCULAR SCREENING; LDL GOAL LESS THAN 130         PLAN:  Orders Placed This Encounter     Hemoglobin A1c     lisinopril (ZESTRIL) 5 MG tablet     metFORMIN (GLUCOPHAGE-XR) 500 MG 24 hr tablet     metoprolol succinate ER (TOPROL-XL) 200 MG 24 hr tablet     tamsulosin (FLOMAX) 0.4 MG capsule     atorvastatin (LIPITOR) 40 MG tablet

## 2021-03-06 ENCOUNTER — IMMUNIZATION (OUTPATIENT)
Dept: FAMILY MEDICINE | Facility: CLINIC | Age: 71
End: 2021-03-06
Payer: COMMERCIAL

## 2021-03-06 PROCEDURE — 0001A PR COVID VAC PFIZER DIL RECON 30 MCG/0.3 ML IM: CPT

## 2021-03-06 PROCEDURE — 91300 PR COVID VAC PFIZER DIL RECON 30 MCG/0.3 ML IM: CPT

## 2021-03-15 ENCOUNTER — NURSE TRIAGE (OUTPATIENT)
Dept: NURSING | Facility: CLINIC | Age: 71
End: 2021-03-15

## 2021-03-15 NOTE — TELEPHONE ENCOUNTER
Pt is calling.    Had his first COVID vaccine 9 days ago. He has an appointment for his second vaccine   He keeps getting texts that tell him to go to his [employer] account. He does not have [employer] and has no idea how to use a computer. He does not have a computer to use either.  This is has 5th call. Sent to patient relations and got a voicemail. He stated that he was told to call other numbers.  Patient is very upset that he keeps getting transferred.  Phone number given to patient relations here. I advised him to leave a voicemail and they will call him back.  He verbalized understanding. Transferred to patient relations.    Additional Information    Negative: Nursing judgment    Negative: Nursing judgment    Negative: Nursing judgment    Negative: Nursing judgment    Information only question and nurse able to answer    Protocols used: INFORMATION ONLY CALL - NO TRIAGE-A-OH    Tika Wilcox RN  [facility] Nurse Advisor  3/15/2021 at 4:22 PM

## 2021-03-27 ENCOUNTER — IMMUNIZATION (OUTPATIENT)
Dept: FAMILY MEDICINE | Facility: CLINIC | Age: 71
End: 2021-03-27
Attending: FAMILY MEDICINE
Payer: COMMERCIAL

## 2021-03-27 PROCEDURE — 0002A PR COVID VAC PFIZER DIL RECON 30 MCG/0.3 ML IM: CPT

## 2021-03-27 PROCEDURE — 91300 PR COVID VAC PFIZER DIL RECON 30 MCG/0.3 ML IM: CPT

## 2021-09-08 ENCOUNTER — HOSPITAL ENCOUNTER (EMERGENCY)
Facility: CLINIC | Age: 71
Discharge: HOME | End: 2021-09-08
Attending: NURSE PRACTITIONER | Admitting: NURSE PRACTITIONER
Payer: MEDICARE

## 2021-09-08 VITALS
RESPIRATION RATE: 18 BRPM | HEART RATE: 60 BPM | DIASTOLIC BLOOD PRESSURE: 77 MMHG | OXYGEN SATURATION: 93 % | TEMPERATURE: 97.7 F | SYSTOLIC BLOOD PRESSURE: 148 MMHG

## 2021-09-08 DIAGNOSIS — M79.662 PAIN OF LEFT LOWER LEG: ICD-10-CM

## 2021-09-08 LAB — D DIMER PPP FEU-MCNC: 0.41 UG/ML FEU (ref 0–0.5)

## 2021-09-08 PROCEDURE — G0463 HOSPITAL OUTPT CLINIC VISIT: HCPCS | Performed by: NURSE PRACTITIONER

## 2021-09-08 PROCEDURE — 36415 COLL VENOUS BLD VENIPUNCTURE: CPT | Performed by: NURSE PRACTITIONER

## 2021-09-08 PROCEDURE — 85379 FIBRIN DEGRADATION QUANT: CPT | Performed by: NURSE PRACTITIONER

## 2021-09-08 PROCEDURE — 99213 OFFICE O/P EST LOW 20 MIN: CPT | Performed by: NURSE PRACTITIONER

## 2021-09-08 RX ORDER — DICLOFENAC POTASSIUM 50 MG/1
50 TABLET, FILM COATED ORAL 3 TIMES DAILY
Qty: 30 TABLET | Refills: 0 | Status: SHIPPED | OUTPATIENT
Start: 2021-09-08 | End: 2021-09-18

## 2021-09-08 ASSESSMENT — ENCOUNTER SYMPTOMS
ABDOMINAL PAIN: 0
MYALGIAS: 1
CHILLS: 0
DIZZINESS: 0
FEVER: 0
EYE DISCHARGE: 0
LIGHT-HEADEDNESS: 0
RHINORRHEA: 0
COLOR CHANGE: 0
JOINT SWELLING: 0
SINUS PRESSURE: 0
HEADACHES: 0
SORE THROAT: 0
ARTHRALGIAS: 0
SINUS PAIN: 0
FATIGUE: 0
VOMITING: 0
NAUSEA: 0
TROUBLE SWALLOWING: 0
COUGH: 0
SHORTNESS OF BREATH: 0
EYE REDNESS: 0
NUMBNESS: 0
WEAKNESS: 0

## 2021-09-08 NOTE — ED TRIAGE NOTES
Right lower extremity pain later side hurts when he lies down. No pain with activity. No fever or chills

## 2021-09-08 NOTE — ED PROVIDER NOTES
History   No chief complaint on file.    HPI  Angel Hill is a 71 year old male who presents to the urgent care for evaluation of left calf pain for 3 weeks.  Pain is described to the lateral aspect of the left calf and described as an intense cramping and shooting pain, 5/10.  Pain occurs when the leg is in passive positioning such as lying down and is improved with walking.  Tender to deep palpation however when lying down light touch causes shooting pain to the left foot and hip.  No recent illness.  No fevers, edema, erythema, cough, chest pain, shortness of breath.  No recent injury.      Allergies:  Allergies   Allergen Reactions     Artificial Sweetner (Informational Only) [Artificial Sweetner (Informational Only) ] Other (See Comments)     Aggravated acid reflex      Gluten Meal Other (See Comments)     aggravates to acid reflex     Nka [No Known Allergies]        Problem List:    Patient Active Problem List    Diagnosis Date Noted     Hydrocele in adult 08/31/2020     Priority: Medium     Obesity (BMI 35.0-39.9) with comorbidity (H) 08/19/2019     Priority: Medium     Type 2 diabetes mellitus without complication, without long-term current use of insulin (H) 08/02/2018     Priority: Medium     Osteoarthritis of left hip, unspecified osteoarthritis type 09/06/2016     Priority: Medium     Hypertension goal BP (blood pressure) < 140/90 09/24/2013     Priority: Medium     Advanced directives, counseling/discussion 09/14/2012     Priority: Medium     Patient does not have an Advance/Health Care Directive (HCD), declines information/referral.    Nitza Valderrama  September 14, 2012         CARDIOVASCULAR SCREENING; LDL GOAL LESS THAN 130 10/31/2010     Priority: Medium     PROSTATE CARCINOMA 01/14/2008     Priority: Medium     Obesity 01/14/2008     Priority: Medium     Problem list name updated by automated process. Provider to review       Melanoma of skin (H) 12/02/2005     Priority: Medium      melanoma  Problem list name updated by automated process. Provider to review       Urethritis 12/02/2005     Priority: Medium     ulcer 20 + years ago  Problem list name updated by automated process. Provider to review          Past Medical History:    Past Medical History:   Diagnosis Date     Melanoma of skin, site unspecified      Urethritis, unspecified        Past Surgical History:    Past Surgical History:   Procedure Laterality Date     ABLATE VEIN VARICOSE RADIO FREQUENCY WITHOUT PHLEBECTOMY MULTIPLE STAB  9/20/2012    Procedure: ABLATE VEIN VARICOSE RADIO FREQUENCY WITHOUT PHLEBECTOMY MULTIPLE STAB;  Right Leg VNUS Ablation, Right ankle ulcer debridement;  Surgeon: Jordan Lopez MD;  Location: [facility]     COLONOSCOPY  9/19/2002     COLONOSCOPY N/A 2/23/2017    Procedure: COLONOSCOPY;  Surgeon: Georges Kiser MD;  Location: [facility]     ESOPHAGOSCOPY, GASTROSCOPY, DUODENOSCOPY (EGD), COMBINED N/A 2/23/2017    Procedure: COMBINED ESOPHAGOSCOPY, GASTROSCOPY, DUODENOSCOPY (EGD), BIOPSY SINGLE OR MULTIPLE;  Surgeon: Georges Kiser MD;  Location: [facility]     SURGICAL HISTORY OF -       cyst removed from face     SURGICAL HISTORY OF -       imapacted wisdom teeth     SURGICAL HISTORY OF -   1997    hammer toe     SURGICAL HISTORY OF -   7/01    metastalic melanoma     SURGICAL HISTORY OF -   9/01    left axillary node removal     SURGICAL HISTORY OF -   4/08    prostate cryoablation       Family History:    Family History   Problem Relation Age of Onset     Musculoskeletal Disorder Mother      Cancer Father      Diabetes Father      Prostate Cancer Father      Colon Cancer Father      Hypertension Maternal Grandmother      Heart Disease Maternal Grandfather      Heart Disease Paternal Grandmother      Heart Disease Paternal Grandfather      Diabetes Paternal Grandfather      Musculoskeletal Disorder Sister         foot problems     Unknown/Adopted Sister      Unknown/Adopted Brother      Diabetes Paternal  Half-Sister        Social History:  Marital Status:  Single [1]  Social History     Tobacco Use     Smoking status: Former Smoker     Quit date: 1988     Years since quittin.7     Smokeless tobacco: Former User     Tobacco comment:    Substance Use Topics     Alcohol use: Not Currently     Comment: 1 beers once a month     Drug use: No        Medications:    diclofenac (CATAFLAM) 50 MG tablet  albuterol (PROAIR HFA/PROVENTIL HFA/VENTOLIN HFA) 108 (90 Base) MCG/ACT inhaler  ASPIRIN PO  atorvastatin (LIPITOR) 40 MG tablet  blood glucose monitoring (SHARATH CONTOUR NEXT) test strip  blood glucose monitoring (SHARATH MICROLET) lancets  Cholecalciferol (VITAMIN D3 PO)  coenzyme Q-10 200 MG CAPS  fish oil-omega-3 fatty acids (OMEGA 3) 1000 MG capsule  lisinopril (ZESTRIL) 5 MG tablet  metFORMIN (GLUCOPHAGE-XR) 500 MG 24 hr tablet  metoprolol succinate ER (TOPROL-XL) 200 MG 24 hr tablet  tamsulosin (FLOMAX) 0.4 MG capsule          Review of Systems   Constitutional: Negative for chills, fatigue and fever.   HENT: Negative for congestion, ear discharge, ear pain, rhinorrhea, sinus pressure, sinus pain, sore throat and trouble swallowing.    Eyes: Negative for discharge and redness.   Respiratory: Negative for cough and shortness of breath.    Cardiovascular: Negative for chest pain.   Gastrointestinal: Negative for abdominal pain, nausea and vomiting.   Musculoskeletal: Positive for myalgias. Negative for arthralgias and joint swelling.   Skin: Negative for color change and rash.   Neurological: Negative for dizziness, weakness, light-headedness, numbness and headaches.   All other systems reviewed and are negative.      Physical Exam   BP: (!) 148/77  Pulse: 60  Temp: 97.7  F (36.5  C)  Resp: 18  SpO2: 93 %      Physical Exam  Constitutional:       General: He is not in acute distress.     Appearance: He is well-developed. He is not diaphoretic.   HENT:      Head: Normocephalic.   Eyes:      Conjunctiva/sclera:  Conjunctivae normal.      Pupils: Pupils are equal, round, and reactive to light.   Cardiovascular:      Rate and Rhythm: Normal rate and regular rhythm.      Pulses: Normal pulses.   Pulmonary:      Effort: Pulmonary effort is normal. No respiratory distress.      Breath sounds: Normal breath sounds and air entry. No decreased air movement. No decreased breath sounds, wheezing or rhonchi.   Abdominal:      General: There is no distension.      Palpations: Abdomen is soft.      Tenderness: There is no abdominal tenderness.   Musculoskeletal:         General: Normal range of motion.      Cervical back: Normal range of motion and neck supple.   Skin:     General: Skin is warm.      Capillary Refill: Capillary refill takes less than 2 seconds.   Neurological:      General: No focal deficit present.      Mental Status: He is alert and oriented to person, place, and time.   Psychiatric:         Mood and Affect: Mood normal.       ED Course        Procedures     Results for orders placed or performed during the hospital encounter of 09/08/21 (from the past 24 hour(s))   D dimer quantitative   Result Value Ref Range    D-Dimer Quantitative 0.41 0.00 - 0.50 ug/mL FEU    Narrative    This D-dimer assay is intended for use in conjunction with a clinical pretest probability assessment model to exclude pulmonary embolism (PE) and deep venous thrombosis (DVT) in outpatients suspected of PE or DVT. The cut-off value is 0.50 ug/mL FEU.       Medications - No data to display    Assessments & Plan (with Medical Decision Making)   Angel Hill is a 71 year old male who presents to the urgent care for evaluation of left calf pain for 3 weeks.  Pain is described to the lateral aspect of the left calf and described as an intense cramping and shooting pain, 5/10.  Pain occurs when the leg is in passive positioning such as lying down and is improved with walking.  Tender to deep palpation however when lying down light touch causes  shooting pain to the left foot and hip. Vital signs normal. Physical exam as above. Slight edema to bilateral lower extremities however patient reports this is baseline. Unable to recreate reported pain, no outward abnormality. D dimer 0.41, DVT unlikely. No indication for imaging as there was no injury. Will treat as muscle strain or inflammation, diclofenac x10 days.  Follow-up with PCP if symptoms persist.  Return to the department with new or worsening symptoms.  Patient is agreeable to plan of care and comfortable discharge.  Discharged in good condition and ambulating without difficulty.    I have reviewed the nursing notes.    I have reviewed the findings, diagnosis, plan and need for follow up with the patient.  Discharge Medication List as of 9/8/2021  4:47 PM      START taking these medications    Details   diclofenac (CATAFLAM) 50 MG tablet Take 1 tablet (50 mg) by mouth 3 times daily for 10 days, Disp-30 tablet, R-0, E-Prescribe           Final diagnoses:   Pain of left lower leg     9/8/2021   [facility] EMERGENCY DEPT     Nuzhat Silva, APRN CNP  09/08/21 [id number]

## 2021-09-11 NOTE — PROGRESS NOTES
Appointment source: Established Patient  [facility]  [facility]rology Staff: Alex Nino MD    Subjective: This is a 69 year old year old male returning for follow up of a right hydrocele.    The hydrocele, which prompted a previous urology visit, continues to be an annoyance and he is requesting that I drained percutaneously.    His postvoid residual of 173 mL is an improvement from his last visit.  His voiding is currently not an issue on 0.4 mg of tamsulosin daily.    Objective: After he was prepped and draped the right hydrocele was drained percutaneously of about 100 cc of clear yellow fluid.    Assessment: Symptomatic right hydrocele.    Plan: He will return for further drainage and possible sclerotherapy if this hydrocele continues to be an issue.          
Yes

## 2025-03-20 NOTE — TELEPHONE ENCOUNTER
I attempted to call the patient and there is no answering machine. Svitlana GUAJARDO RN     Lab Results   Component Value Date    GLUCOSE 105 (H) 08/08/2024    BUN 21 08/08/2024    CREATININE 1.16 08/08/2024     08/08/2024    K 4.3 08/08/2024     (H) 08/08/2024    CALCIUM 9.1 08/08/2024    PROTEINTOT 6.9 08/08/2024    ALBUMIN 4.1 08/08/2024    ALT 31 08/08/2024    AST 28 08/08/2024    ALKPHOS 150 (H) 08/08/2024    BILITOT 0.3 08/08/2024    GLOB 2.8 08/08/2024    AGRATIO 1.5 08/08/2024    BCR 18.1 08/08/2024    ANIONGAP 6.0 08/08/2024    EGFR 67.8 08/08/2024